# Patient Record
Sex: FEMALE | Race: WHITE | NOT HISPANIC OR LATINO | URBAN - METROPOLITAN AREA
[De-identification: names, ages, dates, MRNs, and addresses within clinical notes are randomized per-mention and may not be internally consistent; named-entity substitution may affect disease eponyms.]

---

## 2022-04-18 ENCOUNTER — HISTORICAL (OUTPATIENT)
Dept: LAB | Facility: HOSPITAL | Age: 67
End: 2022-04-18

## 2022-04-18 LAB
ABS NEUT (OLG): 9.41
ALBUMIN SERPL-MCNC: 3.4 G/DL (ref 3.4–4.8)
ALBUMIN/GLOB SERPL: 0.9 {RATIO} (ref 1.1–2)
ALP SERPL-CCNC: 78 U/L (ref 40–150)
ALT SERPL-CCNC: 12 U/L (ref 0–55)
AST SERPL-CCNC: 19 U/L (ref 5–34)
BASOPHILS # BLD AUTO: 0.04 10*3/UL
BASOPHILS NFR BLD AUTO: 0.3 %
BILIRUB SERPL-MCNC: 0.2 MG/DL
BILIRUBIN DIRECT+TOT PNL SERPL-MCNC: 0.1
BILIRUBIN DIRECT+TOT PNL SERPL-MCNC: 0.1 (ref 0–0.5)
BUN SERPL-MCNC: 12 MG/DL (ref 9.8–20.1)
CALCIUM SERPL-MCNC: 9.1 MG/DL (ref 8.7–10.5)
CHLORIDE SERPL-SCNC: 103 MMOL/L (ref 98–107)
CHOLEST SERPL-MCNC: 225 MG/DL
CHOLEST/HDLC SERPL: 5 {RATIO} (ref 0–5)
CO2 SERPL-SCNC: 28 MMOL/L (ref 23–31)
CREAT SERPL-MCNC: 0.81 MG/DL (ref 0.55–1.02)
EOSINOPHIL # BLD AUTO: 0.07 10*3/UL
EOSINOPHIL NFR BLD AUTO: 0.5 %
ERYTHROCYTE [DISTWIDTH] IN BLOOD BY AUTOMATED COUNT: 15 %
GLOBULIN SER-MCNC: 3.8 G/DL (ref 2.4–3.5)
GLUCOSE SERPL-MCNC: 112 MG/DL (ref 82–115)
HCT VFR BLD AUTO: 43.8 % (ref 34–46)
HDLC SERPL-MCNC: 48 MG/DL (ref 35–60)
HEMOLYSIS INTERF INDEX SERPL-ACNC: 9
HGB BLD-MCNC: 14.1 G/DL (ref 11.3–15.4)
ICTERIC INTERF INDEX SERPL-ACNC: 0
IMM GRANULOCYTES # BLD AUTO: 0.17 10*3/UL (ref 0–0.1)
IMM GRANULOCYTES NFR BLD AUTO: 1.3 % (ref 0–1)
LDLC SERPL CALC-MCNC: 153 MG/DL (ref 50–140)
LIPEMIC INTERF INDEX SERPL-ACNC: 2
LYMPHOCYTES # BLD AUTO: 2.39 10*3/UL
LYMPHOCYTES NFR BLD AUTO: 18.3 %
MANUAL DIFF? (OHS): NO
MCH RBC QN AUTO: 31.8 PG (ref 27–33)
MCHC RBC AUTO-ENTMCNC: 32.2 G/DL (ref 32–35)
MCV RBC AUTO: 98.6 FL (ref 81–97)
MONOCYTES # BLD AUTO: 1.01 10*3/UL
MONOCYTES NFR BLD AUTO: 7.7 %
NEUTROPHILS # BLD AUTO: 9.41 10*3/UL
NEUTROPHILS NFR BLD AUTO: 71.9 %
PLATELET # BLD AUTO: 511 10*3/UL (ref 140–450)
PMV BLD AUTO: 11 FL
POTASSIUM SERPL-SCNC: 3.6 MMOL/L (ref 3.5–5.1)
PROT SERPL-MCNC: 7.2 G/DL (ref 5.8–7.6)
RBC # BLD AUTO: 4.44 10*6/UL (ref 3.9–5)
SODIUM SERPL-SCNC: 143 MMOL/L (ref 136–145)
TRIGL SERPL-MCNC: 122 MG/DL (ref 37–140)
VLDLC SERPL CALC-MCNC: 24 MG/DL
WBC # SPEC AUTO: 13.09 10*3/UL (ref 3.4–9.2)

## 2023-05-26 ENCOUNTER — OFFICE VISIT (OUTPATIENT)
Dept: FAMILY MEDICINE | Facility: CLINIC | Age: 68
End: 2023-05-26
Payer: COMMERCIAL

## 2023-05-26 VITALS
DIASTOLIC BLOOD PRESSURE: 72 MMHG | HEIGHT: 61 IN | WEIGHT: 136 LBS | RESPIRATION RATE: 18 BRPM | TEMPERATURE: 97 F | SYSTOLIC BLOOD PRESSURE: 120 MMHG | BODY MASS INDEX: 25.68 KG/M2 | HEART RATE: 97 BPM | OXYGEN SATURATION: 98 %

## 2023-05-26 DIAGNOSIS — F17.210 CIGARETTE SMOKER: ICD-10-CM

## 2023-05-26 DIAGNOSIS — Z12.31 SCREENING MAMMOGRAM, ENCOUNTER FOR: ICD-10-CM

## 2023-05-26 DIAGNOSIS — I10 HYPERTENSION, UNSPECIFIED TYPE: ICD-10-CM

## 2023-05-26 DIAGNOSIS — F41.9 ANXIETY: ICD-10-CM

## 2023-05-26 DIAGNOSIS — Z12.11 COLON CANCER SCREENING: ICD-10-CM

## 2023-05-26 DIAGNOSIS — Z00.00 WELLNESS EXAMINATION: Primary | ICD-10-CM

## 2023-05-26 PROCEDURE — 3074F SYST BP LT 130 MM HG: CPT | Mod: CPTII,,, | Performed by: FAMILY MEDICINE

## 2023-05-26 PROCEDURE — 1159F MED LIST DOCD IN RCRD: CPT | Mod: CPTII,,, | Performed by: FAMILY MEDICINE

## 2023-05-26 PROCEDURE — 3008F PR BODY MASS INDEX (BMI) DOCUMENTED: ICD-10-PCS | Mod: CPTII,,, | Performed by: FAMILY MEDICINE

## 2023-05-26 PROCEDURE — 3008F BODY MASS INDEX DOCD: CPT | Mod: CPTII,,, | Performed by: FAMILY MEDICINE

## 2023-05-26 PROCEDURE — 3288F PR FALLS RISK ASSESSMENT DOCUMENTED: ICD-10-PCS | Mod: CPTII,,, | Performed by: FAMILY MEDICINE

## 2023-05-26 PROCEDURE — 1101F PR PT FALLS ASSESS DOC 0-1 FALLS W/OUT INJ PAST YR: ICD-10-PCS | Mod: CPTII,,, | Performed by: FAMILY MEDICINE

## 2023-05-26 PROCEDURE — 1101F PT FALLS ASSESS-DOCD LE1/YR: CPT | Mod: CPTII,,, | Performed by: FAMILY MEDICINE

## 2023-05-26 PROCEDURE — 99406 PR TOBACCO USE CESSATION INTERMEDIATE 3-10 MINUTES: ICD-10-PCS | Mod: ,,, | Performed by: FAMILY MEDICINE

## 2023-05-26 PROCEDURE — 99387 PR PREVENTIVE VISIT,NEW,65 & OVER: ICD-10-PCS | Mod: 25,,, | Performed by: FAMILY MEDICINE

## 2023-05-26 PROCEDURE — 1160F RVW MEDS BY RX/DR IN RCRD: CPT | Mod: CPTII,,, | Performed by: FAMILY MEDICINE

## 2023-05-26 PROCEDURE — 1159F PR MEDICATION LIST DOCUMENTED IN MEDICAL RECORD: ICD-10-PCS | Mod: CPTII,,, | Performed by: FAMILY MEDICINE

## 2023-05-26 PROCEDURE — 99406 BEHAV CHNG SMOKING 3-10 MIN: CPT | Mod: ,,, | Performed by: FAMILY MEDICINE

## 2023-05-26 PROCEDURE — 1160F PR REVIEW ALL MEDS BY PRESCRIBER/CLIN PHARMACIST DOCUMENTED: ICD-10-PCS | Mod: CPTII,,, | Performed by: FAMILY MEDICINE

## 2023-05-26 PROCEDURE — 3074F PR MOST RECENT SYSTOLIC BLOOD PRESSURE < 130 MM HG: ICD-10-PCS | Mod: CPTII,,, | Performed by: FAMILY MEDICINE

## 2023-05-26 PROCEDURE — 3078F PR MOST RECENT DIASTOLIC BLOOD PRESSURE < 80 MM HG: ICD-10-PCS | Mod: CPTII,,, | Performed by: FAMILY MEDICINE

## 2023-05-26 PROCEDURE — 99387 INIT PM E/M NEW PAT 65+ YRS: CPT | Mod: 25,,, | Performed by: FAMILY MEDICINE

## 2023-05-26 PROCEDURE — 3288F FALL RISK ASSESSMENT DOCD: CPT | Mod: CPTII,,, | Performed by: FAMILY MEDICINE

## 2023-05-26 PROCEDURE — 3078F DIAST BP <80 MM HG: CPT | Mod: CPTII,,, | Performed by: FAMILY MEDICINE

## 2023-05-26 RX ORDER — ALPRAZOLAM 1 MG/1
1 TABLET ORAL 3 TIMES DAILY
COMMUNITY
Start: 2023-01-16 | End: 2023-05-26 | Stop reason: SDUPTHER

## 2023-05-26 RX ORDER — DILTIAZEM HYDROCHLORIDE 60 MG/1
60 TABLET, FILM COATED ORAL 2 TIMES DAILY
COMMUNITY
Start: 2023-05-19 | End: 2024-02-26

## 2023-05-26 RX ORDER — ALPRAZOLAM 1 MG/1
1 TABLET ORAL 3 TIMES DAILY
Qty: 90 TABLET | Refills: 3 | Status: SHIPPED | OUTPATIENT
Start: 2023-05-26 | End: 2023-10-30

## 2023-05-26 NOTE — PROGRESS NOTES
"Subjective:       Patient ID: Julianna Vaughn is a 67 y.o. female.    Chief Complaint: EST CARE/FRANKS       Establish care    Hypertension  - tolerating medication (no side effects)  - no headaches  - patient without any complaints    Review of Systems   Constitutional: Negative.    Respiratory: Negative.     Cardiovascular: Negative.    Gastrointestinal: Negative.    Psychiatric/Behavioral: Negative.          Anxiety:  Tolerating medication, medication working well, patient without any complaints, needs Rx         Objective:      /72 (BP Location: Left arm, Patient Position: Sitting, BP Method: Large (Manual))   Pulse 97   Temp 96.7 °F (35.9 °C)   Resp 18   Ht 5' 1" (1.549 m)   Wt 61.7 kg (136 lb)   SpO2 98%   BMI 25.70 kg/m²    Physical Exam  Constitutional:       Appearance: Normal appearance.   Cardiovascular:      Rate and Rhythm: Normal rate and regular rhythm.   Pulmonary:      Effort: Pulmonary effort is normal.      Breath sounds: Normal breath sounds.   Abdominal:      General: Abdomen is flat. Bowel sounds are normal.      Palpations: Abdomen is soft.   Musculoskeletal:         General: Normal range of motion.   Skin:     General: Skin is warm.   Neurological:      Mental Status: She is alert.   Psychiatric:         Mood and Affect: Mood normal.         Behavior: Behavior normal.         Thought Content: Thought content normal.         Judgment: Judgment normal.             Assessment:       Problem List Items Addressed This Visit          Psychiatric    Anxiety    Relevant Medications    ALPRAZolam (XANAX) 1 MG tablet       Cardiac/Vascular    Hypertension     Other Visit Diagnoses       Wellness examination    -  Primary    Relevant Orders    CBC Auto Differential    Comprehensive Metabolic Panel    Lipid Panel    Hepatitis C Antibody    HIV 1/2 Ag/Ab (4th Gen)    Mammo Digital Screening Bilat w/ Matthew    Cologuard Screening (Multitarget Stool DNA)    CT Chest Lung Screening Low Dose    Cigarette " smoker        Relevant Orders    CT Chest Lung Screening Low Dose    Colon cancer screening        Relevant Orders    Cologuard Screening (Multitarget Stool DNA)    Screening mammogram, encounter for        Relevant Orders    Mammo Digital Screening Bilat w/ Matthew               Plan:   1. Wellness examination  -     CBC Auto Differential; Future; Expected date: 05/26/2023  -     Comprehensive Metabolic Panel; Future; Expected date: 05/26/2023  -     Lipid Panel; Future; Expected date: 05/26/2023  -     Hepatitis C Antibody; Future; Expected date: 05/26/2023  -     HIV 1/2 Ag/Ab (4th Gen); Future; Expected date: 05/26/2023  -     Mammo Digital Screening Bilat w/ Matthew; Future; Expected date: 05/26/2023  -     Cologuard Screening (Multitarget Stool DNA); Future; Expected date: 05/26/2023  -     CT Chest Lung Screening Low Dose; Future; Expected date: 05/26/2023  Lab work scheduled  Continue current medication  Continue diet/exercise  Return to clinic with any concerns    2. Hypertension, unspecified type  Controlled  Continue current Rx medication  Return to clinic with any concerns    3. Anxiety  -     ALPRAZolam (XANAX) 1 MG tablet; Take 1 tablet (1 mg total) by mouth 3 (three) times daily.  Dispense: 90 tablet; Refill: 3  Controlled  Continue current Rx medication  Return to clinic with any concerns    4. Cigarette smoker  -     CT Chest Lung Screening Low Dose; Future; Expected date: 05/26/2023  Adverse affects of tobacco discussed; 5 minutes spent counseling  Patient defers medication/ tobacco cessation program  Patient encouraged to remain abstinent from tobacco products  Schedule lung cancer screening  Return to clinic with any concerns    5. Colon cancer screening  -     Cologuard Screening (Multitarget Stool DNA); Future; Expected date: 05/26/2023  Patient defers colonoscopy; schedule Cologuard    6. Screening mammogram, encounter for  -     Mammo Digital Screening Bilat w/ Matthew; Future; Expected date:  05/26/2023  Schedule mammogram

## 2023-07-04 ENCOUNTER — HOSPITAL ENCOUNTER (EMERGENCY)
Facility: HOSPITAL | Age: 68
Discharge: HOME | End: 2023-07-04
Attending: EMERGENCY MEDICINE
Payer: COMMERCIAL

## 2023-07-04 VITALS
TEMPERATURE: 98 F | HEART RATE: 92 BPM | HEIGHT: 61 IN | BODY MASS INDEX: 25.49 KG/M2 | OXYGEN SATURATION: 95 % | SYSTOLIC BLOOD PRESSURE: 127 MMHG | DIASTOLIC BLOOD PRESSURE: 81 MMHG | RESPIRATION RATE: 18 BRPM | WEIGHT: 135 LBS

## 2023-07-04 DIAGNOSIS — A08.4 VIRAL GASTROENTERITIS: Primary | ICD-10-CM

## 2023-07-04 LAB
ALBUMIN SERPL-MCNC: 3.8 G/DL (ref 3.4–4.8)
ALBUMIN/GLOB SERPL: 1.1 RATIO (ref 1.1–2)
ALP SERPL-CCNC: 72 UNIT/L (ref 40–150)
ALT SERPL-CCNC: 18 UNIT/L (ref 0–55)
AST SERPL-CCNC: 25 UNIT/L (ref 5–34)
BASOPHILS # BLD AUTO: 0.05 X10(3)/MCL
BASOPHILS NFR BLD AUTO: 0.6 %
BILIRUBIN DIRECT+TOT PNL SERPL-MCNC: 0.3 MG/DL
BUN SERPL-MCNC: 17 MG/DL (ref 9.8–20.1)
CALCIUM SERPL-MCNC: 10 MG/DL (ref 8.4–10.2)
CHLORIDE SERPL-SCNC: 108 MMOL/L (ref 98–107)
CO2 SERPL-SCNC: 26 MMOL/L (ref 23–31)
CREAT SERPL-MCNC: 0.92 MG/DL (ref 0.55–1.02)
EOSINOPHIL # BLD AUTO: 0.27 X10(3)/MCL (ref 0–0.9)
EOSINOPHIL NFR BLD AUTO: 3.2 %
ERYTHROCYTE [DISTWIDTH] IN BLOOD BY AUTOMATED COUNT: 13.6 % (ref 11.5–17)
GFR SERPLBLD CREATININE-BSD FMLA CKD-EPI: >60 MLS/MIN/1.73/M2
GLOBULIN SER-MCNC: 3.5 GM/DL (ref 2.4–3.5)
GLUCOSE SERPL-MCNC: 102 MG/DL (ref 82–115)
HCT VFR BLD AUTO: 39.1 % (ref 37–47)
HGB BLD-MCNC: 13.2 G/DL (ref 12–16)
IMM GRANULOCYTES # BLD AUTO: 0.03 X10(3)/MCL (ref 0–0.04)
IMM GRANULOCYTES NFR BLD AUTO: 0.4 %
LYMPHOCYTES # BLD AUTO: 1.69 X10(3)/MCL (ref 0.6–4.6)
LYMPHOCYTES NFR BLD AUTO: 19.9 %
MCH RBC QN AUTO: 32.4 PG (ref 27–31)
MCHC RBC AUTO-ENTMCNC: 33.8 G/DL (ref 33–36)
MCV RBC AUTO: 95.8 FL (ref 80–94)
MONOCYTES # BLD AUTO: 0.84 X10(3)/MCL (ref 0.1–1.3)
MONOCYTES NFR BLD AUTO: 9.9 %
NEUTROPHILS # BLD AUTO: 5.63 X10(3)/MCL (ref 2.1–9.2)
NEUTROPHILS NFR BLD AUTO: 66 %
NRBC BLD AUTO-RTO: 0 %
PLATELET # BLD AUTO: 345 X10(3)/MCL (ref 130–400)
PMV BLD AUTO: 10.1 FL (ref 7.4–10.4)
POTASSIUM SERPL-SCNC: 3.6 MMOL/L (ref 3.5–5.1)
PROT SERPL-MCNC: 7.3 GM/DL (ref 5.8–7.6)
RBC # BLD AUTO: 4.08 X10(6)/MCL (ref 4.2–5.4)
SODIUM SERPL-SCNC: 145 MMOL/L (ref 136–145)
WBC # SPEC AUTO: 8.51 X10(3)/MCL (ref 4.5–11.5)

## 2023-07-04 PROCEDURE — 85025 COMPLETE CBC W/AUTO DIFF WBC: CPT | Performed by: EMERGENCY MEDICINE

## 2023-07-04 PROCEDURE — 99283 EMERGENCY DEPT VISIT LOW MDM: CPT

## 2023-07-04 PROCEDURE — 80053 COMPREHEN METABOLIC PANEL: CPT | Performed by: EMERGENCY MEDICINE

## 2023-07-04 PROCEDURE — 25000003 PHARM REV CODE 250: Performed by: EMERGENCY MEDICINE

## 2023-07-04 RX ORDER — ONDANSETRON 4 MG/1
4 TABLET, ORALLY DISINTEGRATING ORAL
Status: COMPLETED | OUTPATIENT
Start: 2023-07-04 | End: 2023-07-04

## 2023-07-04 RX ORDER — ONDANSETRON 4 MG/1
4 TABLET, ORALLY DISINTEGRATING ORAL ONCE
Qty: 1 TABLET | Refills: 0 | Status: SHIPPED | OUTPATIENT
Start: 2023-07-04 | End: 2023-07-04

## 2023-07-04 RX ORDER — DEXBROMPHENIRAMINE MALEATE, DEXTROMETHORPHAN HBR, PHENYLEPHRINE HCL 2; 20; 10 G/1; G/1; G/1
1 TABLET ORAL 4 TIMES DAILY PRN
COMMUNITY
Start: 2023-03-30

## 2023-07-04 RX ORDER — FLUTICASONE PROPIONATE 50 MCG
1 SPRAY, SUSPENSION (ML) NASAL
COMMUNITY
Start: 2023-03-30

## 2023-07-04 RX ORDER — ONDANSETRON 4 MG/1
4 TABLET, ORALLY DISINTEGRATING ORAL ONCE
Qty: 1 TABLET | Refills: 0 | Status: SHIPPED | OUTPATIENT
Start: 2023-07-04 | End: 2023-07-04 | Stop reason: SDUPTHER

## 2023-07-04 RX ADMIN — ONDANSETRON 4 MG: 4 TABLET, ORALLY DISINTEGRATING ORAL at 12:07

## 2023-07-04 NOTE — ED PROVIDER NOTES
Encounter Date: 7/4/2023       History     Chief Complaint   Patient presents with    Nausea    Vomiting    Diarrhea     Diarrhea x2 days vomiting started yesterday. Denies any abd pain.     Patient is a 67-year-old female presenting with complain of intermittent nausea diarrhea x2 days.  Patient denies any fever chills.  No abdominal pain.  Patient denies chest pain or cough.  No shortness of breath.  Patient denies any nausea currently.  Patient denies any bright red blood per rectum or melena.    Review of patient's allergies indicates:  No Known Allergies  Past Medical History:   Diagnosis Date    Anxiety disorder, unspecified     Essential (primary) hypertension      History reviewed. No pertinent surgical history.  History reviewed. No pertinent family history.  Social History     Tobacco Use    Smoking status: Every Day     Packs/day: 1.00     Years: 35.00     Pack years: 35.00     Types: Cigarettes    Smokeless tobacco: Never   Substance Use Topics    Alcohol use: Yes    Drug use: Never     Review of Systems   Constitutional: Negative.    HENT: Negative.     Cardiovascular: Negative.    Gastrointestinal:  Positive for diarrhea, nausea and vomiting. Negative for abdominal distention, abdominal pain, anal bleeding, blood in stool and constipation.   Genitourinary: Negative.    Musculoskeletal: Negative.    Neurological: Negative.    Psychiatric/Behavioral: Negative.       Physical Exam     Initial Vitals [07/04/23 1218]   BP Pulse Resp Temp SpO2   127/81 92 18 98.1 °F (36.7 °C) 95 %      MAP       --         Physical Exam    Nursing note and vitals reviewed.  Constitutional: She appears well-developed and well-nourished.   HENT:   Head: Normocephalic.   Neck: Neck supple.   Normal range of motion.  Cardiovascular:  Normal rate, regular rhythm and normal heart sounds.           Pulmonary/Chest: Breath sounds normal. No respiratory distress. She has no rhonchi.   Abdominal: Abdomen is soft. There is no abdominal  tenderness. There is no guarding.   Musculoskeletal:         General: Normal range of motion.      Cervical back: Normal range of motion and neck supple.     Neurological: She is alert and oriented to person, place, and time. She has normal strength.   Psychiatric: She has a normal mood and affect.       ED Course   Procedures  Labs Reviewed   COMPREHENSIVE METABOLIC PANEL - Abnormal; Notable for the following components:       Result Value    Chloride 108 (*)     All other components within normal limits   CBC WITH DIFFERENTIAL - Abnormal; Notable for the following components:    RBC 4.08 (*)     MCV 95.8 (*)     MCH 32.4 (*)     All other components within normal limits   CBC W/ AUTO DIFFERENTIAL    Narrative:     The following orders were created for panel order CBC auto differential.  Procedure                               Abnormality         Status                     ---------                               -----------         ------                     CBC with Differential[[id number]]        Abnormal            Final result                 Please view results for these tests on the individual orders.          Imaging Results    None          Medications   ondansetron disintegrating tablet 4 mg (4 mg Oral Given 7/4/23 1243)     Medical Decision Making:   Initial Assessment:   As per HPI  Differential Diagnosis:   Viral syndrome, acute diarrhea, nausea, dehydration, electrolyte abnormality  Clinical Tests:   Lab Tests: Ordered and Reviewed       <> Summary of Lab: All labs are stable  ED Management:  No vomiting while in the ER.  Patient continues to deny any nausea.  All labs are stable.  Will discharge patient home with a prescription for Zofran.           ED Course as of 07/04/23 1319   Tue Jul 04, 2023   1316 Patient remains in no apparent distress.  Patient denies any nausea still [KG]      ED Course User Index  [KG] Timmy Nelson MD                 Clinical Impression:   Final diagnoses:  [A08.4]  Viral gastroenteritis (Primary)        ED Disposition Condition    Discharge Stable          ED Prescriptions       Medication Sig Dispense Start Date End Date Auth. Provider    ondansetron (ZOFRAN-ODT) 4 MG TbDL (Expires today) Take 1 tablet (4 mg total) by mouth once. for 1 dose 1 tablet 7/4/2023 7/4/2023 Timmy Nelson MD          Follow-up Information       Follow up With Specialties Details Why Contact Info    Be Carpenter MD Family Medicine In 2 days  [address] [facility] [address]  [phone]      [facility] Emergency Medicine  As needed, If symptoms worsen [address] [place] [address] [id number] [phone]             Timmy Nelson MD  07/04/23 4375

## 2023-07-04 NOTE — ED NOTES
Pt ambulatory to room.  Reports diarrhea starting two days ago and nausea since yesterday.  Denies any abdominal pain, reports nausea.  Bilateral bowel sounds WNL.  Unable to keep solid food down.

## 2023-08-11 LAB — NONINV COLON CA DNA+OCC BLD SCRN STL QL: POSITIVE

## 2023-08-14 NOTE — PROGRESS NOTES
POSITIVE cologuard. Will need referral for a diagnostic colonoscopy.  Please refer patient to Dr. Jiang

## 2023-08-15 ENCOUNTER — TELEPHONE (OUTPATIENT)
Dept: FAMILY MEDICINE | Facility: CLINIC | Age: 68
End: 2023-08-15
Payer: MEDICARE

## 2023-08-15 DIAGNOSIS — R19.5 POSITIVE COLORECTAL CANCER SCREENING USING COLOGUARD TEST: Primary | ICD-10-CM

## 2023-08-15 NOTE — TELEPHONE ENCOUNTER
----- Message from Be Carpenter MD sent at 8/14/2023 11:55 AM CDT -----  POSITIVE cologuard. Will need referral for a diagnostic colonoscopy.  Please refer patient to Dr. Jiang

## 2023-08-16 ENCOUNTER — TELEPHONE (OUTPATIENT)
Dept: FAMILY MEDICINE | Facility: CLINIC | Age: 68
End: 2023-08-16
Payer: MEDICARE

## 2023-08-16 NOTE — TELEPHONE ENCOUNTER
Pt called stating she does not want to do the colonoscopy to cancel the referral. Explained to pt the risk because of abnormal cologuard.

## 2023-10-30 DIAGNOSIS — F41.9 ANXIETY: ICD-10-CM

## 2023-10-30 RX ORDER — ALPRAZOLAM 1 MG/1
1 TABLET ORAL 3 TIMES DAILY
Qty: 90 TABLET | Refills: 3 | Status: SHIPPED | OUTPATIENT
Start: 2023-10-30

## 2023-11-14 DIAGNOSIS — F41.9 ANXIETY: ICD-10-CM

## 2023-12-06 ENCOUNTER — OFFICE VISIT (OUTPATIENT)
Dept: FAMILY MEDICINE | Facility: CLINIC | Age: 68
End: 2023-12-06
Payer: MEDICARE

## 2023-12-06 VITALS
HEART RATE: 68 BPM | BODY MASS INDEX: 25.3 KG/M2 | HEIGHT: 61 IN | TEMPERATURE: 98 F | DIASTOLIC BLOOD PRESSURE: 68 MMHG | RESPIRATION RATE: 18 BRPM | OXYGEN SATURATION: 98 % | WEIGHT: 134 LBS | SYSTOLIC BLOOD PRESSURE: 106 MMHG

## 2023-12-06 DIAGNOSIS — I10 HYPERTENSION, UNSPECIFIED TYPE: Primary | ICD-10-CM

## 2023-12-06 DIAGNOSIS — F41.9 ANXIETY: ICD-10-CM

## 2023-12-06 DIAGNOSIS — Z12.31 SCREENING MAMMOGRAM, ENCOUNTER FOR: ICD-10-CM

## 2023-12-06 PROCEDURE — 99214 OFFICE O/P EST MOD 30 MIN: CPT | Mod: ,,, | Performed by: FAMILY MEDICINE

## 2023-12-06 PROCEDURE — 99214 PR OFFICE/OUTPT VISIT, EST, LEVL IV, 30-39 MIN: ICD-10-PCS | Mod: ,,, | Performed by: FAMILY MEDICINE

## 2023-12-06 NOTE — PROGRESS NOTES
"Subjective:       Patient ID: Julianna Vaughn is a 68 y.o. female.    Chief Complaint: 6 month follow up      Routine        Review of Systems   Constitutional: Negative.    Respiratory: Negative.     Cardiovascular: Negative.    Gastrointestinal: Negative.    Psychiatric/Behavioral: Negative.             Objective:      /68 (BP Location: Left arm, Patient Position: Sitting, BP Method: Large (Manual))   Pulse 68   Temp 97.6 °F (36.4 °C)   Resp 18   Ht 5' 1" (1.549 m)   Wt 60.8 kg (134 lb)   SpO2 98%   BMI 25.32 kg/m²    Physical Exam  Constitutional:       Appearance: Normal appearance.   Cardiovascular:      Rate and Rhythm: Normal rate and regular rhythm.      Heart sounds: Normal heart sounds.   Pulmonary:      Effort: Pulmonary effort is normal.      Breath sounds: Normal breath sounds.   Neurological:      Mental Status: She is alert.   Psychiatric:         Mood and Affect: Mood normal.         Behavior: Behavior normal.         Thought Content: Thought content normal.         Judgment: Judgment normal.               Assessment:       Problem List Items Addressed This Visit    None         Plan:   {There are no diagnoses linked to this encounter. (Refresh or delete this SmartLink)}           "

## 2023-12-06 NOTE — PROGRESS NOTES
"Subjective:       Patient ID: Julianna Vaughn is a 68 y.o. female.    Chief Complaint: 6 month follow up      Routine      Hypertension  - tolerating medication (no side effects)  - no headaches  - patient without any complaints    Review of Systems   Constitutional: Negative.    Respiratory: Negative.     Cardiovascular: Negative.    Gastrointestinal: Negative.    Psychiatric/Behavioral: Negative.          Anxiety: tolerating medication, medication working well, patient without any complaints             Objective:      /68 (BP Location: Left arm, Patient Position: Sitting, BP Method: Large (Manual))   Pulse 68   Temp 97.6 °F (36.4 °C)   Resp 18   Ht 5' 1" (1.549 m)   Wt 60.8 kg (134 lb)   SpO2 98%   BMI 25.32 kg/m²    Physical Exam  Constitutional:       Appearance: Normal appearance.   Cardiovascular:      Rate and Rhythm: Normal rate and regular rhythm.      Heart sounds: Normal heart sounds.   Pulmonary:      Effort: Pulmonary effort is normal.      Breath sounds: Normal breath sounds.   Neurological:      Mental Status: She is alert.   Psychiatric:         Mood and Affect: Mood normal.         Behavior: Behavior normal.         Thought Content: Thought content normal.         Judgment: Judgment normal.               Assessment:       Problem List Items Addressed This Visit          Psychiatric    Anxiety       Cardiac/Vascular    Hypertension - Primary     Other Visit Diagnoses       Screening mammogram, encounter for                   Plan:   1. Hypertension, unspecified type  Controlled  Continue current Rx medication  Return to clinic with any concerns    2. Anxiety  Controlled  Continue current Rx medication  Return to clinic with any concerns    3. Screening mammogram, encounter for  Patient defers mammogram             "

## 2024-02-26 DIAGNOSIS — I10 HYPERTENSION, UNSPECIFIED TYPE: Primary | ICD-10-CM

## 2024-02-26 RX ORDER — DILTIAZEM HYDROCHLORIDE 60 MG/1
60 TABLET, FILM COATED ORAL 2 TIMES DAILY
Qty: 60 TABLET | Refills: 3 | Status: SHIPPED | OUTPATIENT
Start: 2024-02-26

## 2024-04-09 DIAGNOSIS — R09.89 CHEST CONGESTION: Primary | ICD-10-CM

## 2024-04-09 RX ORDER — PROMETHAZINE HYDROCHLORIDE AND DEXTROMETHORPHAN HYDROBROMIDE 6.25; 15 MG/5ML; MG/5ML
5 SYRUP ORAL EVERY 4 HOURS PRN
Qty: 180 ML | Refills: 0 | Status: SHIPPED | OUTPATIENT
Start: 2024-04-09 | End: 2024-04-19

## 2024-04-09 RX ORDER — BROMPHENIRAMINE MALEATE AND PHENYLEPHRINE HYDROCHLORIDE 4; 10 MG/1; MG/1
1 TABLET, COATED ORAL EVERY 4 HOURS PRN
Qty: 30 EACH | Refills: 0 | Status: SHIPPED | OUTPATIENT
Start: 2024-04-09 | End: 2024-04-19

## 2024-05-06 DIAGNOSIS — F41.9 ANXIETY: ICD-10-CM

## 2024-05-06 RX ORDER — ALPRAZOLAM 1 MG/1
1 TABLET ORAL 3 TIMES DAILY
Qty: 90 TABLET | Refills: 3 | Status: SHIPPED | OUTPATIENT
Start: 2024-05-06

## 2024-05-09 DIAGNOSIS — I10 HYPERTENSION, UNSPECIFIED TYPE: ICD-10-CM

## 2024-05-09 DIAGNOSIS — Z11.4 ENCOUNTER FOR HIV (HUMAN IMMUNODEFICIENCY VIRUS) TEST: Primary | ICD-10-CM

## 2024-05-09 DIAGNOSIS — Z13.6 SCREENING FOR ISCHEMIC HEART DISEASE: ICD-10-CM

## 2024-05-09 DIAGNOSIS — Z00.00 WELLNESS EXAMINATION: ICD-10-CM

## 2024-05-09 DIAGNOSIS — Z11.59 NEED FOR HEPATITIS C SCREENING TEST: ICD-10-CM

## 2024-05-24 DIAGNOSIS — Z12.31 SCREENING MAMMOGRAM, ENCOUNTER FOR: Primary | ICD-10-CM

## 2024-06-06 ENCOUNTER — HOSPITAL ENCOUNTER (OUTPATIENT)
Dept: RADIOLOGY | Facility: HOSPITAL | Age: 69
Discharge: HOME | End: 2024-06-06
Attending: FAMILY MEDICINE
Payer: COMMERCIAL

## 2024-06-06 DIAGNOSIS — Z12.31 SCREENING MAMMOGRAM, ENCOUNTER FOR: ICD-10-CM

## 2024-06-06 PROCEDURE — 77063 BREAST TOMOSYNTHESIS BI: CPT | Mod: 26,,, | Performed by: STUDENT IN AN ORGANIZED HEALTH CARE EDUCATION/TRAINING PROGRAM

## 2024-06-06 PROCEDURE — 77067 SCR MAMMO BI INCL CAD: CPT | Mod: 26,,, | Performed by: STUDENT IN AN ORGANIZED HEALTH CARE EDUCATION/TRAINING PROGRAM

## 2024-06-06 PROCEDURE — 77067 SCR MAMMO BI INCL CAD: CPT | Mod: TC

## 2024-06-06 PROCEDURE — 77063 BREAST TOMOSYNTHESIS BI: CPT | Mod: TC

## 2024-06-10 ENCOUNTER — TELEPHONE (OUTPATIENT)
Dept: FAMILY MEDICINE | Facility: CLINIC | Age: 69
End: 2024-06-10
Payer: COMMERCIAL

## 2024-06-10 NOTE — TELEPHONE ENCOUNTER
----- Message from Be Carpenter MD sent at 6/10/2024  4:19 PM CDT -----  Normal MMG. Repeat in 1 year.

## 2024-06-13 ENCOUNTER — OFFICE VISIT (OUTPATIENT)
Dept: FAMILY MEDICINE | Facility: CLINIC | Age: 69
End: 2024-06-13
Payer: COMMERCIAL

## 2024-06-13 VITALS
HEIGHT: 61 IN | WEIGHT: 129.81 LBS | SYSTOLIC BLOOD PRESSURE: 122 MMHG | DIASTOLIC BLOOD PRESSURE: 62 MMHG | HEART RATE: 68 BPM | BODY MASS INDEX: 24.51 KG/M2 | TEMPERATURE: 98 F | RESPIRATION RATE: 18 BRPM | OXYGEN SATURATION: 100 %

## 2024-06-13 DIAGNOSIS — M54.50 CHRONIC RIGHT-SIDED LOW BACK PAIN WITHOUT SCIATICA: ICD-10-CM

## 2024-06-13 DIAGNOSIS — F41.9 ANXIETY: ICD-10-CM

## 2024-06-13 DIAGNOSIS — F17.210 CIGARETTE SMOKER: ICD-10-CM

## 2024-06-13 DIAGNOSIS — G89.29 CHRONIC RIGHT-SIDED LOW BACK PAIN WITHOUT SCIATICA: ICD-10-CM

## 2024-06-13 DIAGNOSIS — I10 HYPERTENSION, UNSPECIFIED TYPE: ICD-10-CM

## 2024-06-13 DIAGNOSIS — Z00.00 WELLNESS EXAMINATION: Primary | ICD-10-CM

## 2024-06-13 PROCEDURE — 3078F DIAST BP <80 MM HG: CPT | Mod: CPTII,,, | Performed by: FAMILY MEDICINE

## 2024-06-13 PROCEDURE — 3008F BODY MASS INDEX DOCD: CPT | Mod: CPTII,,, | Performed by: FAMILY MEDICINE

## 2024-06-13 PROCEDURE — 1101F PT FALLS ASSESS-DOCD LE1/YR: CPT | Mod: CPTII,,, | Performed by: FAMILY MEDICINE

## 2024-06-13 PROCEDURE — 3074F SYST BP LT 130 MM HG: CPT | Mod: CPTII,,, | Performed by: FAMILY MEDICINE

## 2024-06-13 PROCEDURE — 1159F MED LIST DOCD IN RCRD: CPT | Mod: CPTII,,, | Performed by: FAMILY MEDICINE

## 2024-06-13 PROCEDURE — 1160F RVW MEDS BY RX/DR IN RCRD: CPT | Mod: CPTII,,, | Performed by: FAMILY MEDICINE

## 2024-06-13 PROCEDURE — 3288F FALL RISK ASSESSMENT DOCD: CPT | Mod: CPTII,,, | Performed by: FAMILY MEDICINE

## 2024-06-13 PROCEDURE — 99397 PER PM REEVAL EST PAT 65+ YR: CPT | Mod: ,,, | Performed by: FAMILY MEDICINE

## 2024-06-13 NOTE — PROGRESS NOTES
"Subjective:      Patient ID: Julianna Vaughn is a 68 y.o. female.    Chief Complaint: Wellness      Wellness    Hypertension  Controlled  Continue current Rx medication  Return to clinic with any concerns    Review of Systems   Constitutional: Negative.    HENT: Negative.     Respiratory: Negative.     Cardiovascular: Negative.    Gastrointestinal: Negative.  Negative for fecal incontinence.   Genitourinary: Negative.  Negative for bladder incontinence.   Musculoskeletal:  Positive for back pain (by chiropractor in the past, reports pain radiates down right leg).   Integumentary:  Negative.   Neurological: Negative.    Hematological: Negative.    Psychiatric/Behavioral: Negative.          Tobacco use: 1/2 pk/day/25 years         Objective:     /62 (BP Location: Left arm, Patient Position: Sitting, BP Method: Large (Manual))   Pulse 68   Temp 97.9 °F (36.6 °C)   Resp 18   Ht 5' 1" (1.549 m)   Wt 58.9 kg (129 lb 12.8 oz)   SpO2 100%   BMI 24.53 kg/m²    Physical Exam  Constitutional:       Appearance: Normal appearance.   Cardiovascular:      Rate and Rhythm: Normal rate and regular rhythm.   Pulmonary:      Effort: Pulmonary effort is normal.      Breath sounds: Normal breath sounds.   Abdominal:      General: Abdomen is flat. Bowel sounds are normal.      Palpations: Abdomen is soft.   Musculoskeletal:         General: Normal range of motion.   Neurological:      Mental Status: She is alert.   Psychiatric:         Mood and Affect: Mood normal.         Behavior: Behavior normal.         Thought Content: Thought content normal.         Judgment: Judgment normal.       Recent Results (from the past 504 hour(s))   Lipid Panel    Collection Time: 06/06/24 10:03 AM   Result Value Ref Range    Cholesterol Total 226 (H) <=200 mg/dL    HDL Cholesterol 51 35 - 60 mg/dL    Triglyceride 136 37 - 140 mg/dL    Cholesterol/HDL Ratio 4 0 - 5    Very Low Density Lipoprotein 27     LDL Cholesterol 148.00 (H) 50.00 - 140.00 " mg/dL   Comprehensive Metabolic Panel    Collection Time: 06/06/24 10:03 AM   Result Value Ref Range    Sodium 142 136 - 145 mmol/L    Potassium 4.2 3.5 - 5.1 mmol/L    Chloride 105 98 - 107 mmol/L    CO2 27 23 - 31 mmol/L    Glucose 112 82 - 115 mg/dL    Blood Urea Nitrogen 25.0 (H) 9.8 - 20.1 mg/dL    Creatinine 0.90 0.55 - 1.02 mg/dL    Calcium 9.8 8.4 - 10.2 mg/dL    Protein Total 7.6 5.8 - 7.6 gm/dL    Albumin 3.9 3.4 - 4.8 g/dL    Globulin 3.7 (H) 2.4 - 3.5 gm/dL    Albumin/Globulin Ratio 1.1 1.1 - 2.0 ratio    Bilirubin Total 0.2 <=1.5 mg/dL    ALP 91 40 - 150 unit/L    ALT 13 0 - 55 unit/L    AST 20 5 - 34 unit/L    eGFR >60 mL/min/1.73/m2    Anion Gap 10.0 mEq/L    BUN/Creatinine Ratio 28    Hepatitis C Antibody    Collection Time: 06/06/24 10:03 AM   Result Value Ref Range    Hep C Ab Interp Nonreactive Nonreactive   HIV 1/2 Ag/Ab (4th Gen)    Collection Time: 06/06/24 10:03 AM   Result Value Ref Range    HIV Nonreactive Nonreactive   CBC with Differential    Collection Time: 06/06/24 10:03 AM   Result Value Ref Range    WBC 9.39 4.50 - 11.50 x10(3)/mcL    RBC 4.52 4.20 - 5.40 x10(6)/mcL    Hgb 14.1 12.0 - 16.0 g/dL    Hct 43.0 37.0 - 47.0 %    MCV 95.1 (H) 80.0 - 94.0 fL    MCH 31.2 (H) 27.0 - 31.0 pg    MCHC 32.8 (L) 33.0 - 36.0 g/dL    RDW 14.1 11.5 - 17.0 %    Platelet 417 (H) 130 - 400 x10(3)/mcL    MPV 10.0 7.4 - 10.4 fL    Neut % 67.6 %    Lymph % 22.3 %    Mono % 7.6 %    Eos % 1.5 %    Basophil % 0.5 %    Lymph # 2.09 0.6 - 4.6 x10(3)/mcL    Neut # 6.35 2.1 - 9.2 x10(3)/mcL    Mono # 0.71 0.1 - 1.3 x10(3)/mcL    Eos # 0.14 0 - 0.9 x10(3)/mcL    Baso # 0.05 <=0.2 x10(3)/mcL    IG# 0.05 (H) 0 - 0.04 x10(3)/mcL    IG% 0.5 %    NRBC% 0.0 %            Assessment:     Problem List Items Addressed This Visit          Psychiatric    Anxiety       Cardiac/Vascular    Hypertension     Other Visit Diagnoses       Wellness examination    -  Primary    Cigarette smoker        Chronic right-sided low back pain  without sciatica        Relevant Orders    X-Ray Lumbar Spine 2 Or 3 Views             Plan:   1. Wellness examination  Lab work discussed with patient  Continue current medication  Continue diet/exercise  Return to clinic with any concerns    2. Hypertension, unspecified type  Controlled  Continue current Rx medication  Return to clinic with any concerns    3. Anxiety  Controlled  Continue current Rx medication  Return to clinic with any concerns    4. Cigarette smoker  Adverse affects of tobacco discussed; 5 minutes spent counseling  Discussed medication; patient defers at this time  Patient encouraged to remain abstinent from tobacco products  Patient defers lung cancer screening  Return to clinic with any concerns    5. Chronic right-sided low back pain without sciatica  Schedule L-spine x-ray  OTC NSAIDs   Monitor   Return to clinic with any concerns

## 2024-06-18 ENCOUNTER — HOSPITAL ENCOUNTER (OUTPATIENT)
Dept: RADIOLOGY | Facility: HOSPITAL | Age: 69
Discharge: HOME | End: 2024-06-18
Attending: FAMILY MEDICINE
Payer: COMMERCIAL

## 2024-06-18 DIAGNOSIS — G89.29 CHRONIC RIGHT-SIDED LOW BACK PAIN WITHOUT SCIATICA: ICD-10-CM

## 2024-06-18 DIAGNOSIS — M54.50 CHRONIC RIGHT-SIDED LOW BACK PAIN WITHOUT SCIATICA: ICD-10-CM

## 2024-06-18 PROCEDURE — 72100 X-RAY EXAM L-S SPINE 2/3 VWS: CPT | Mod: TC

## 2024-06-19 ENCOUNTER — TELEPHONE (OUTPATIENT)
Dept: FAMILY MEDICINE | Facility: CLINIC | Age: 69
End: 2024-06-19
Payer: COMMERCIAL

## 2024-06-19 NOTE — TELEPHONE ENCOUNTER
----- Message from Be Carpenter MD sent at 6/18/2024  6:19 PM CDT -----  Please inform patient of results.     1. Degenerative changes

## 2024-06-20 DIAGNOSIS — G89.29 CHRONIC RIGHT-SIDED LOW BACK PAIN WITHOUT SCIATICA: Primary | ICD-10-CM

## 2024-06-20 DIAGNOSIS — M54.50 CHRONIC RIGHT-SIDED LOW BACK PAIN WITHOUT SCIATICA: Primary | ICD-10-CM

## 2024-06-26 RX ORDER — VARENICLINE TARTRATE 1 MG/1
1 TABLET, FILM COATED ORAL 2 TIMES DAILY
Qty: 60 TABLET | Refills: 4 | Status: SHIPPED | OUTPATIENT
Start: 2024-06-26 | End: 2024-07-26

## 2024-06-26 RX ORDER — VARENICLINE TARTRATE 0.5 (11)-1
KIT ORAL
Qty: 1 EACH | Refills: 0 | Status: SHIPPED | OUTPATIENT
Start: 2024-06-26

## 2024-07-19 DIAGNOSIS — I10 HYPERTENSION, UNSPECIFIED TYPE: ICD-10-CM

## 2024-07-22 RX ORDER — DILTIAZEM HYDROCHLORIDE 60 MG/1
60 TABLET, FILM COATED ORAL 2 TIMES DAILY
Qty: 60 TABLET | Refills: 3 | Status: SHIPPED | OUTPATIENT
Start: 2024-07-22

## 2024-10-07 DIAGNOSIS — F41.9 ANXIETY: ICD-10-CM

## 2024-10-07 RX ORDER — ALPRAZOLAM 1 MG/1
1 TABLET ORAL 3 TIMES DAILY
Qty: 90 TABLET | Refills: 1 | Status: SHIPPED | OUTPATIENT
Start: 2024-10-07

## 2024-10-07 NOTE — TELEPHONE ENCOUNTER
----- Message from Jagruti sent at 10/7/2024  9:17 AM CDT -----  Regarding: med refill  Alprazolam    [facility]    [phone]

## 2024-10-10 DIAGNOSIS — I10 HYPERTENSION, UNSPECIFIED TYPE: ICD-10-CM

## 2024-10-10 RX ORDER — DILTIAZEM HYDROCHLORIDE 60 MG/1
60 TABLET, FILM COATED ORAL 2 TIMES DAILY
Qty: 60 TABLET | Refills: 3 | Status: SHIPPED | OUTPATIENT
Start: 2024-10-10

## 2024-10-24 ENCOUNTER — TELEPHONE (OUTPATIENT)
Dept: FAMILY MEDICINE | Facility: CLINIC | Age: 69
End: 2024-10-24
Payer: COMMERCIAL

## 2024-11-15 DIAGNOSIS — F41.9 ANXIETY: ICD-10-CM

## 2024-11-18 ENCOUNTER — TELEPHONE (OUTPATIENT)
Dept: FAMILY MEDICINE | Facility: CLINIC | Age: 69
End: 2024-11-18
Payer: COMMERCIAL

## 2024-11-18 DIAGNOSIS — I10 HYPERTENSION, UNSPECIFIED TYPE: ICD-10-CM

## 2024-11-18 RX ORDER — DILTIAZEM HYDROCHLORIDE 60 MG/1
60 TABLET, FILM COATED ORAL 2 TIMES DAILY
Qty: 60 TABLET | Refills: 3 | Status: SHIPPED | OUTPATIENT
Start: 2024-11-18

## 2024-11-18 RX ORDER — ALPRAZOLAM 1 MG/1
1 TABLET ORAL 3 TIMES DAILY
Qty: 90 TABLET | Refills: 1 | Status: SHIPPED | OUTPATIENT
Start: 2024-11-18

## 2024-11-18 NOTE — TELEPHONE ENCOUNTER
----- Message from Darby sent at 11/18/2024  8:10 AM CST -----  Regarding: REFILL MED  NEED A REFILL ON DILDAZEM BLOOD PRESSURE MEDICATION TO [facility]

## 2024-12-24 DIAGNOSIS — I10 HYPERTENSION, UNSPECIFIED TYPE: ICD-10-CM

## 2024-12-24 DIAGNOSIS — F41.9 ANXIETY: ICD-10-CM

## 2024-12-26 RX ORDER — DILTIAZEM HYDROCHLORIDE 60 MG/1
60 TABLET, FILM COATED ORAL 2 TIMES DAILY
Qty: 60 TABLET | Refills: 3 | Status: SHIPPED | OUTPATIENT
Start: 2024-12-26

## 2024-12-26 RX ORDER — ALPRAZOLAM 1 MG/1
1 TABLET ORAL 3 TIMES DAILY
Qty: 90 TABLET | Refills: 0 | Status: SHIPPED | OUTPATIENT
Start: 2024-12-26

## 2025-01-16 ENCOUNTER — OFFICE VISIT (OUTPATIENT)
Dept: FAMILY MEDICINE | Facility: CLINIC | Age: 70
End: 2025-01-16
Payer: COMMERCIAL

## 2025-01-16 VITALS
BODY MASS INDEX: 24.13 KG/M2 | SYSTOLIC BLOOD PRESSURE: 130 MMHG | RESPIRATION RATE: 18 BRPM | OXYGEN SATURATION: 100 % | TEMPERATURE: 97 F | HEIGHT: 61 IN | WEIGHT: 127.81 LBS | DIASTOLIC BLOOD PRESSURE: 72 MMHG | HEART RATE: 92 BPM

## 2025-01-16 DIAGNOSIS — F17.210 CIGARETTE SMOKER: ICD-10-CM

## 2025-01-16 DIAGNOSIS — G25.81 RESTLESS LEG SYNDROME: ICD-10-CM

## 2025-01-16 DIAGNOSIS — I10 HYPERTENSION, UNSPECIFIED TYPE: Primary | ICD-10-CM

## 2025-01-16 DIAGNOSIS — Z78.0 POSTMENOPAUSAL: ICD-10-CM

## 2025-01-16 DIAGNOSIS — F41.9 ANXIETY: ICD-10-CM

## 2025-01-16 PROCEDURE — 99214 OFFICE O/P EST MOD 30 MIN: CPT | Mod: ,,,

## 2025-01-16 PROCEDURE — 3008F BODY MASS INDEX DOCD: CPT | Mod: CPTII,,,

## 2025-01-16 PROCEDURE — 3075F SYST BP GE 130 - 139MM HG: CPT | Mod: CPTII,,,

## 2025-01-16 PROCEDURE — 1159F MED LIST DOCD IN RCRD: CPT | Mod: CPTII,,,

## 2025-01-16 PROCEDURE — 1126F AMNT PAIN NOTED NONE PRSNT: CPT | Mod: CPTII,,,

## 2025-01-16 PROCEDURE — 1101F PT FALLS ASSESS-DOCD LE1/YR: CPT | Mod: CPTII,,,

## 2025-01-16 PROCEDURE — 1160F RVW MEDS BY RX/DR IN RCRD: CPT | Mod: CPTII,,,

## 2025-01-16 PROCEDURE — 3288F FALL RISK ASSESSMENT DOCD: CPT | Mod: CPTII,,,

## 2025-01-16 PROCEDURE — 3078F DIAST BP <80 MM HG: CPT | Mod: CPTII,,,

## 2025-01-16 NOTE — PROGRESS NOTES
Family Medicine      Patient ID: [id number]     Chief Complaint: 6 month f/u and restless legs (Patient c/o restless legs at night x6 months.)      HPI:     Julianna Vaughn is a 69 y.o. female here today for six-month chronic condition follow-up. Tolerating diltiazem without side effects, no headaches, regularly checks home blood pressure with readings 120s/70s.  Additionally tolerating Xanax without side effects for anxiety, medication works well, reports she takes very sparingly a few times per week q.h.s. p.r.n. anxiety.    Additional complaint of bilateral restless legs almost every night for the past 6 months, no relief with OTC RLS supplement. States she was previously prescribed iron supplement to assist with RLS, however she stopped taking it several months ago. Recently resumed OTC iron within the past week and has already noticed improvement in RLS.  Denies any associated bilateral LE numbness/tingling/pain.  Reports she has tried gabapentin in the past for similar complaint and did not like the way the medication made her feel.    Additionally patient reports she had a recent LDCT lung screening [place] at [place]; we will request records.    Past Medical History:   Diagnosis Date    Anxiety disorder, unspecified     Essential (primary) hypertension         Past Surgical History:   Procedure Laterality Date    RESECTION OF ANEURYSM  10/15/2024    Dr. Gavin Gardner in [place]        Review of patient's allergies indicates:  No Known Allergies     Patient Care Team:  Be Carpenter MD as PCP - General (Family Medicine)  MARY ANNE Gardner MD (Cardiothoracic Surgery)  Santhosh Quinones MD (Anesthesiology)     Subjective:     Review of Systems   Constitutional: Negative.    Respiratory: Negative.     Cardiovascular: Negative.    Gastrointestinal: Negative.    Genitourinary: Negative.    Musculoskeletal:         Bilateral restless legs at night   Skin: Negative.    Neurological:  "Negative.    Psychiatric/Behavioral: Negative.         Objective:     Visit Vitals  /72 (BP Location: Left arm, Patient Position: Sitting)   Pulse 92   Temp 97.2 °F (36.2 °C)   Resp 18   Ht 5' 1" (1.549 m)   Wt 58 kg (127 lb 12.8 oz)   SpO2 100%   BMI 24.15 kg/m²       Physical Exam  Constitutional:       General: She is not in acute distress.     Appearance: Normal appearance. She is not ill-appearing.   Cardiovascular:      Rate and Rhythm: Normal rate and regular rhythm.      Heart sounds: Normal heart sounds.   Pulmonary:      Effort: Pulmonary effort is normal. No respiratory distress.      Breath sounds: Normal breath sounds. No wheezing, rhonchi or rales.   Abdominal:      General: Bowel sounds are normal. There is no distension.      Palpations: Abdomen is soft.      Tenderness: There is no abdominal tenderness. There is no guarding.   Musculoskeletal:         General: Normal range of motion.      Cervical back: Neck supple.      Right lower leg: No edema.      Left lower leg: No edema.   Skin:     General: Skin is warm and dry.      Capillary Refill: Capillary refill takes less than 2 seconds.   Neurological:      General: No focal deficit present.      Mental Status: She is alert and oriented to person, place, and time. Mental status is at baseline.      Sensory: Sensation is intact.      Motor: Motor function is intact.      Coordination: Coordination is intact.      Gait: Gait is intact.   Psychiatric:         Mood and Affect: Mood normal.         Behavior: Behavior normal.         Thought Content: Thought content normal.         Judgment: Judgment normal.       Assessment:       ICD-10-CM ICD-9-CM   1. Hypertension, unspecified type  I10 401.9   2. Anxiety  F41.9 300.00   3. Restless leg syndrome  G25.81 333.94   4. Postmenopausal  Z78.0 V49.81   5. Cigarette smoker  F17.210 305.1      Plan:     1. Hypertension, unspecified type  Assessment & Plan:  Controlled  Continue diltiazem 60 mg by mouth " b.i.d.  Monitor BP at minimum once per day, keep log, ensure you bring log to every office visit  Discussed importance of low sodium/well balanced diet  Discussed physical activity regimen for at least 30 minutes/day  Return to clinic with any concerns       2. Anxiety  Assessment & Plan:  Controlled  Continue Xanax 1 mg by mouth t.i.d. p.r.n. anxiety  Reviewed side effects and importance of compliance with medication  Discussed coping skills to help reduce anxiety  Exercise daily  Avoid caffeine, alcohol and stimulants  Encouraged getting 7-8 hours of uninterrupted sleep per night  Report any symptoms of suicidal or homicidal ideations immediately, if clinic is closed go to nearest emergency room   Return to clinic with any concerns.      3. Restless leg syndrome  Assessment & Plan:  Continue OTC iron supplement daily.  Educated on daily iron supplement taking up to 2-3 months for full effect.  Recommend regular moderate exercise, avoiding vigorous activity in the evening.    Avoid alcohol, caffeine, and nicotine as they can worsening symptoms.  Stay well-hydrated and eat a balanced diet.  You leg massage, heating pads, or cold pack to alleviate symptoms.  Smoking cessation encouraged.  Return to clinic with any concerns.      4. Postmenopausal  Assessment & Plan:  Order for DEXA scan to be scheduled    Orders:  -     CT Bone Density Study 1 + Sites Axial; Future; Expected date: 01/16/2025    5. Cigarette smoker  Assessment & Plan:  LDCT lung screening records requested from [facility].           Follow up in about 5 months (around 6/16/2025) for Annual Wellness, With Labwork Prior to Visit. In addition to their scheduled follow up, the patient has also been instructed to follow up on as needed basis.     Fatou Jiang NP

## 2025-01-16 NOTE — ASSESSMENT & PLAN NOTE
Controlled  Continue diltiazem 60 mg by mouth b.i.d.  Monitor BP at minimum once per day, keep log, ensure you bring log to every office visit  Discussed importance of low sodium/well balanced diet  Discussed physical activity regimen for at least 30 minutes/day  Return to clinic with any concerns

## 2025-01-16 NOTE — ASSESSMENT & PLAN NOTE
Continue OTC iron supplement daily.  Educated on daily iron supplement taking up to 2-3 months for full effect.  Recommend regular moderate exercise, avoiding vigorous activity in the evening.    Avoid alcohol, caffeine, and nicotine as they can worsening symptoms.  Stay well-hydrated and eat a balanced diet.  You leg massage, heating pads, or cold pack to alleviate symptoms.  Smoking cessation encouraged.  Return to clinic with any concerns.

## 2025-01-16 NOTE — ASSESSMENT & PLAN NOTE
Controlled  Continue Xanax 1 mg by mouth t.i.d. p.r.n. anxiety  Reviewed side effects and importance of compliance with medication  Discussed coping skills to help reduce anxiety  Exercise daily  Avoid caffeine, alcohol and stimulants  Encouraged getting 7-8 hours of uninterrupted sleep per night  Report any symptoms of suicidal or homicidal ideations immediately, if clinic is closed go to nearest emergency room   Return to clinic with any concerns.

## 2025-01-24 DIAGNOSIS — F41.9 ANXIETY: ICD-10-CM

## 2025-01-24 RX ORDER — ALPRAZOLAM 1 MG/1
1 TABLET ORAL 3 TIMES DAILY
Qty: 90 TABLET | Refills: 1 | Status: SHIPPED | OUTPATIENT
Start: 2025-01-24

## 2025-01-31 ENCOUNTER — TELEPHONE (OUTPATIENT)
Dept: FAMILY MEDICINE | Facility: CLINIC | Age: 70
End: 2025-01-31
Payer: COMMERCIAL

## 2025-01-31 DIAGNOSIS — G25.81 RESTLESS LEG SYNDROME: Primary | ICD-10-CM

## 2025-01-31 RX ORDER — ROPINIROLE 0.25 MG/1
0.25 TABLET, FILM COATED ORAL NIGHTLY
Qty: 30 TABLET | Refills: 2 | Status: SHIPPED | OUTPATIENT
Start: 2025-01-31 | End: 2025-05-01

## 2025-01-31 NOTE — TELEPHONE ENCOUNTER
Fatou Jiang NP [place], [place]  Caller: Unspecified (Today, 11:19 AM)  We will start ropinirole 0.25 mg by mouth q.h.s. for restless leg syndrome.  Patient can take this medication for the next 3 months while also continuing iron supplement.  We will consider weaning off of ropinirole once the ferritin level therapeutic in approximately 3 months.  Rx sent to [facility] [address] in [place].

## 2025-01-31 NOTE — TELEPHONE ENCOUNTER
Patient called stating you had recommended she call the office if her restless leg symptoms did not improve after taking the OTC iron supplement. Patient reports her symptoms have not improved while taking the iron supplement for 2 weeks now. Wants to know if their is something else. Please advise.

## 2025-02-06 ENCOUNTER — OFFICE VISIT (OUTPATIENT)
Dept: FAMILY MEDICINE | Facility: CLINIC | Age: 70
End: 2025-02-06
Payer: COMMERCIAL

## 2025-02-06 VITALS
TEMPERATURE: 98 F | HEART RATE: 86 BPM | SYSTOLIC BLOOD PRESSURE: 148 MMHG | WEIGHT: 126 LBS | RESPIRATION RATE: 18 BRPM | OXYGEN SATURATION: 98 % | DIASTOLIC BLOOD PRESSURE: 82 MMHG | HEIGHT: 61 IN | BODY MASS INDEX: 23.79 KG/M2

## 2025-02-06 DIAGNOSIS — M54.41 CHRONIC BILATERAL LOW BACK PAIN WITH BILATERAL SCIATICA: ICD-10-CM

## 2025-02-06 DIAGNOSIS — M54.42 CHRONIC BILATERAL LOW BACK PAIN WITH BILATERAL SCIATICA: ICD-10-CM

## 2025-02-06 DIAGNOSIS — G89.29 CHRONIC BILATERAL LOW BACK PAIN WITH BILATERAL SCIATICA: ICD-10-CM

## 2025-02-06 DIAGNOSIS — I10 HYPERTENSION, UNSPECIFIED TYPE: Primary | ICD-10-CM

## 2025-02-06 PROCEDURE — 3288F FALL RISK ASSESSMENT DOCD: CPT | Mod: CPTII,,,

## 2025-02-06 PROCEDURE — 3077F SYST BP >= 140 MM HG: CPT | Mod: CPTII,,,

## 2025-02-06 PROCEDURE — 3079F DIAST BP 80-89 MM HG: CPT | Mod: CPTII,,,

## 2025-02-06 PROCEDURE — 4010F ACE/ARB THERAPY RXD/TAKEN: CPT | Mod: CPTII,,,

## 2025-02-06 PROCEDURE — 3008F BODY MASS INDEX DOCD: CPT | Mod: CPTII,,,

## 2025-02-06 PROCEDURE — 99214 OFFICE O/P EST MOD 30 MIN: CPT | Mod: ,,,

## 2025-02-06 PROCEDURE — 1101F PT FALLS ASSESS-DOCD LE1/YR: CPT | Mod: CPTII,,,

## 2025-02-06 PROCEDURE — 1159F MED LIST DOCD IN RCRD: CPT | Mod: CPTII,,,

## 2025-02-06 PROCEDURE — 1126F AMNT PAIN NOTED NONE PRSNT: CPT | Mod: CPTII,,,

## 2025-02-06 PROCEDURE — 1160F RVW MEDS BY RX/DR IN RCRD: CPT | Mod: CPTII,,,

## 2025-02-06 RX ORDER — LISINOPRIL 5 MG/1
5 TABLET ORAL DAILY
Qty: 30 TABLET | Refills: 1 | Status: SHIPPED | OUTPATIENT
Start: 2025-02-06 | End: 2025-04-07

## 2025-02-06 NOTE — ASSESSMENT & PLAN NOTE
Elevated  Start lisinopril 5 mg by mouth daily  Continue diltiazem 60 mg by mouth b.i.d.  Monitor BP at minimum once per day, keep log, ensure you bring log to every office visit  Discussed importance of low sodium/well balanced diet  Discussed physical activity regimen for at least 30 minutes/day  Return to clinic in 1 month for follow-up or sooner with any concerns.

## 2025-02-06 NOTE — PROGRESS NOTES
Family Medicine      Patient ID: [id number]     Chief Complaint: Hypertension (She states that her BP has been high the last 2 days but prior it was doing better. Thinks it's d/t her back hurting and stress.)    HPI:     Julianna Vaughn is a 69 y.o. female here today for follow-up of hypertension.    Ms. Vaughn reports elevated blood pressure recently. Her blood pressure was 170/100 yesterday at a doctor's appointment, which she attributes to stress. At home, her blood pressure typically runs around 150s/70s. Tolerates Cardizem without side effects, intermittent headaches, reports regularly checking home blood pressure readings.Ms. Vaughn complains of worsening chronic back pain. She has received injections for her back pain previously. The pain exacerbates when bending forward, causing difficulty picking up objects from the floor. Ms. Vaughn has ongoing numbness and tingling in her legs associated with the back pain. She is under the care of a pain management specialist, Dr. Santhosh Klein, but cannot secure an appointment until April.  Attributes recent elevated blood pressure readings to worsening back pain and increased stress.    For pain management, the patient has been prescribed oxycodone. She is hesitant to take it regularly, reports one taken last night due to increased pain. Ms. Vaughn uses OTC topical pain relievers, including a roll-on product from [employer], which provides relief at night.    Ms. Vaughn denies shortness of breath, chest pain, chest tightness, palpitations, B/B incontinence, or saddle anesthesia.    Past Medical History:   Diagnosis Date    Anxiety disorder, unspecified     Essential (primary) hypertension     S/P abdominal aortic aneurysm repair 10/15/2024        Past Surgical History:   Procedure Laterality Date    RESECTION OF ANEURYSM  10/15/2024    Dr. Gavin Gardner in [place]        Review of patient's allergies indicates:  No Known Allergies     Patient Care Team:  Be Carpenter MD as  "PCP - General (Family Medicine)  MARY ANNE Gardner MD (Cardiothoracic Surgery)  Santhosh Quinones MD (Anesthesiology)  Katharina Adler NP (Cardiology)     Subjective:     Review of Systems  General: negative fever, negative chills, negative fatigue, negative weight gain, negative weight loss  Eyes: negative vision changes, negative redness, negative discharge  ENT: negative ear pain, negative nasal congestion, negative sore throat  Cardiovascular: negative chest pain, negative palpitations, negative lower extremity edema, negative chest tightness  Respiratory: negative cough, negative shortness of breath  Gastrointestinal: negative abdominal pain, negative nausea, negative vomiting, negative diarrhea, negative constipation, negative blood in stool  Genitourinary: negative dysuria, negative hematuria, negative frequency, negative urinary incontinence  Musculoskeletal: negative joint pain, negative muscle pain, +back pain  Skin: negative rash, negative lesion  Neurological: +headache, negative dizziness, +numbness, negative tingling  Psychiatric: negative anxiety, negative depression, negative sleep difficulty    Objective:     Visit Vitals  BP (!) 148/82 (BP Location: Right arm, Patient Position: Sitting)   Pulse 86   Temp 97.5 °F (36.4 °C)   Resp 18   Ht 5' 1" (1.549 m)   Wt 57.2 kg (126 lb)   SpO2 98%   BMI 23.81 kg/m²     Physical Exam  Constitutional:       General: She is not in acute distress.     Appearance: Normal appearance. She is not ill-appearing.   Eyes:      Pupils: Pupils are equal, round, and reactive to light.   Cardiovascular:      Rate and Rhythm: Normal rate and regular rhythm.      Heart sounds: Normal heart sounds.   Pulmonary:      Effort: Pulmonary effort is normal. No respiratory distress.      Breath sounds: Normal breath sounds. No wheezing, rhonchi or rales.   Abdominal:      General: Bowel sounds are normal. There is no distension.      Palpations: Abdomen is soft.      Tenderness: " There is no abdominal tenderness.   Musculoskeletal:      Right lower leg: No edema.      Left lower leg: No edema.      Comments: Musculoskeletal:  LUMBOSACRAL SPINE    Inspection: Normal gait; No erythema; No contusion or outward signs of trauma; No post surgical scars; Normal alignment without visible deformities  Palpation: Paraspinous tenderness; No vertebral tenderness; Normal temperature; Sciatic Notch Pain: Negative  ROM:       Flexion: (90) Full and painful       Extension: (30) Full and painless       Left Lateral Flexion: (30) Full and painless       Right Lateral Flexion: (30) Full and painless       Rotation: Full and painless  Strength:       Dorsiflexion:  5/5       Plantarflexion:  5/5  Neurovascular: Intact, 2+ distal pulses, Sensation intact to light touch of bilateral lower extremities, No clonus noted, Strength and Reflexes 2+ to bilateral lower extremities  Lymphatic: No lymphadenopathy    Skin:     General: Skin is warm and dry.   Neurological:      General: No focal deficit present.      Mental Status: She is alert and oriented to person, place, and time. Mental status is at baseline.   Psychiatric:         Mood and Affect: Mood normal.         Behavior: Behavior normal.         Thought Content: Thought content normal.         Judgment: Judgment normal.       Assessment:       ICD-10-CM ICD-9-CM   1. Hypertension, unspecified type  I10 401.9   2. Chronic bilateral low back pain with bilateral sciatica  M54.42 724.2    M54.41 724.3    G89.29 338.29      Plan:     1. Hypertension, unspecified type  Assessment & Plan:  Elevated  Start lisinopril 5 mg by mouth daily  Continue diltiazem 60 mg by mouth b.i.d.  Monitor BP at minimum once per day, keep log, ensure you bring log to every office visit  Discussed importance of low sodium/well balanced diet  Discussed physical activity regimen for at least 30 minutes/day  Return to clinic in 1 month for follow-up or sooner with any concerns.    Orders:  -      lisinopriL (PRINIVIL,ZESTRIL) 5 MG tablet; Take 1 tablet (5 mg total) by mouth once daily.  Dispense: 30 tablet; Refill: 1    2. Chronic bilateral low back pain with bilateral sciatica  Assessment & Plan:  Continue follow-ups with pain management (Dr. Quinones)  Continue oxycodone as prescribed by pain management p.r.n. pain  Recommend OTC Voltaren gel applied topically to affected area q.i.d. p.r.n. pain/inflammation.  Keep scheduled upcoming follow-up in April.  Lower back stretches daily.  Avoid strenuous lifting, use proper body mechanics.  Heating pad, Ice pack, Biofreeze or Epsom salt baths as needed.  Exercise to strengthen core muscles to support your back.            Follow up in about 1 month (around 3/6/2025) for Follow Up, Blood Pressure Check. In addition to their scheduled follow up, the patient has also been instructed to follow up on as needed basis.     This note was generated with the assistance of ambient listening technology. Verbal consent was obtained by the patient and accompanying visitor(s) for the recording of patient appointment to facilitate this note. I attest to having reviewed and edited the generated note for accuracy, though some syntax or spelling errors may persist. Please contact the author of this note for any clarification.       Fatou Jiang NP

## 2025-02-06 NOTE — ASSESSMENT & PLAN NOTE
Continue follow-ups with pain management (Dr. Quinones)  Continue oxycodone as prescribed by pain management p.r.n. pain  Recommend OTC Voltaren gel applied topically to affected area q.i.d. p.r.n. pain/inflammation.  Keep scheduled upcoming follow-up in April.  Lower back stretches daily.  Avoid strenuous lifting, use proper body mechanics.  Heating pad, Ice pack, Biofreeze or Epsom salt baths as needed.  Exercise to strengthen core muscles to support your back.

## 2025-03-05 DIAGNOSIS — I10 HYPERTENSION, UNSPECIFIED TYPE: ICD-10-CM

## 2025-03-05 RX ORDER — LISINOPRIL 5 MG/1
5 TABLET ORAL DAILY
Qty: 30 TABLET | Refills: 1 | Status: SHIPPED | OUTPATIENT
Start: 2025-03-05 | End: 2025-05-04

## 2025-03-11 ENCOUNTER — OFFICE VISIT (OUTPATIENT)
Dept: FAMILY MEDICINE | Facility: CLINIC | Age: 70
End: 2025-03-11
Payer: COMMERCIAL

## 2025-03-11 VITALS
HEART RATE: 98 BPM | WEIGHT: 127 LBS | DIASTOLIC BLOOD PRESSURE: 68 MMHG | BODY MASS INDEX: 23.98 KG/M2 | OXYGEN SATURATION: 100 % | RESPIRATION RATE: 16 BRPM | TEMPERATURE: 97 F | SYSTOLIC BLOOD PRESSURE: 132 MMHG | HEIGHT: 61 IN

## 2025-03-11 DIAGNOSIS — I10 HYPERTENSION, UNSPECIFIED TYPE: Primary | ICD-10-CM

## 2025-03-11 DIAGNOSIS — G25.81 RESTLESS LEG SYNDROME: ICD-10-CM

## 2025-03-11 PROCEDURE — 4010F ACE/ARB THERAPY RXD/TAKEN: CPT | Mod: CPTII,,, | Performed by: FAMILY MEDICINE

## 2025-03-11 PROCEDURE — 1126F AMNT PAIN NOTED NONE PRSNT: CPT | Mod: CPTII,,, | Performed by: FAMILY MEDICINE

## 2025-03-11 PROCEDURE — 1159F MED LIST DOCD IN RCRD: CPT | Mod: CPTII,,, | Performed by: FAMILY MEDICINE

## 2025-03-11 PROCEDURE — G2211 COMPLEX E/M VISIT ADD ON: HCPCS | Mod: ,,, | Performed by: FAMILY MEDICINE

## 2025-03-11 PROCEDURE — 3078F DIAST BP <80 MM HG: CPT | Mod: CPTII,,, | Performed by: FAMILY MEDICINE

## 2025-03-11 PROCEDURE — 3008F BODY MASS INDEX DOCD: CPT | Mod: CPTII,,, | Performed by: FAMILY MEDICINE

## 2025-03-11 PROCEDURE — 1160F RVW MEDS BY RX/DR IN RCRD: CPT | Mod: CPTII,,, | Performed by: FAMILY MEDICINE

## 2025-03-11 PROCEDURE — 3075F SYST BP GE 130 - 139MM HG: CPT | Mod: CPTII,,, | Performed by: FAMILY MEDICINE

## 2025-03-11 PROCEDURE — 99213 OFFICE O/P EST LOW 20 MIN: CPT | Mod: ,,, | Performed by: FAMILY MEDICINE

## 2025-03-11 RX ORDER — DOXYCYCLINE 100 MG/1
100 CAPSULE ORAL EVERY 12 HOURS
COMMUNITY
Start: 2025-02-06

## 2025-03-11 RX ORDER — ROPINIROLE 0.25 MG/1
0.25 TABLET, FILM COATED ORAL NIGHTLY
Qty: 90 TABLET | Refills: 1 | Status: SHIPPED | OUTPATIENT
Start: 2025-03-11 | End: 2025-09-07

## 2025-03-11 RX ORDER — LISINOPRIL 5 MG/1
5 TABLET ORAL DAILY
Qty: 90 TABLET | Refills: 1 | Status: SHIPPED | OUTPATIENT
Start: 2025-03-11 | End: 2025-09-07

## 2025-03-11 NOTE — PROGRESS NOTES
"Subjective:     Patient ID: Julianna Vaughn is a 69 y.o. female.    Chief Complaint: 1 month f/u        History of Present Illness    CHIEF COMPLAINT:  Patient presents today for follow up    BLOOD PRESSURE:  She reports home blood pressure readings ranging from 120s-130s/70s-80s, with mild elevations during episodes of pain. The highest recorded systolic reading was 134.    CURRENT MEDICATIONS:  She is currently taking diltiazem 60 mg twice daily, lisinopril 5 mg, Requip with significant improvement, and Xanax.      ROS:  Musculoskeletal: +joint pain            Review of Systems   Neurological:         RLS: Medication working well, needs Rx       Objective:     /68   Pulse 98   Temp 97 °F (36.1 °C) (Temporal)   Resp 16   Ht 5' 0.63" (1.54 m)   Wt 57.6 kg (127 lb)   SpO2 100%   BMI 24.29 kg/m²    Physical Exam  Constitutional:       Appearance: Normal appearance.   Cardiovascular:      Rate and Rhythm: Normal rate and regular rhythm.      Heart sounds: Normal heart sounds.   Pulmonary:      Effort: Pulmonary effort is normal.      Breath sounds: Normal breath sounds.   Neurological:      Mental Status: She is alert.   Psychiatric:         Mood and Affect: Mood normal.         Behavior: Behavior normal.         Thought Content: Thought content normal.         Judgment: Judgment normal.             Assessment:     Problem List Items Addressed This Visit          Neuro    Restless leg syndrome    Relevant Medications    rOPINIRole (REQUIP) 0.25 MG tablet       Cardiac/Vascular    Hypertension - Primary    Relevant Medications    lisinopriL (PRINIVIL,ZESTRIL) 5 MG tablet        Plan:   1. Hypertension, unspecified type  -     lisinopriL (PRINIVIL,ZESTRIL) 5 MG tablet; Take 1 tablet (5 mg total) by mouth once daily.  Dispense: 90 tablet; Refill: 1  Controlled  Continue current Rx medication (diltiazem/lisinopril)  Return to clinic with any concerns    2. Restless leg syndrome  -     rOPINIRole (REQUIP) 0.25 MG " tablet; Take 1 tablet (0.25 mg total) by mouth every evening.  Dispense: 90 tablet; Refill: 1  Controlled  Continue current Rx medication (frequent)  Return to clinic with any concerns                   This note was generated with the assistance of ambient listening technology. Verbal consent was obtained by the patient and accompanying visitor(s) for the recording of patient appointment to facilitate this note. I attest to having reviewed and edited the generated note for accuracy, though some syntax or spelling errors may persist. Please contact the author of this note for any clarification.

## 2025-03-18 DIAGNOSIS — G25.81 RESTLESS LEG SYNDROME: ICD-10-CM

## 2025-03-18 RX ORDER — ROPINIROLE 0.25 MG/1
0.25 TABLET, FILM COATED ORAL NIGHTLY
Qty: 90 TABLET | Refills: 1 | Status: SHIPPED | OUTPATIENT
Start: 2025-03-18 | End: 2025-09-14

## 2025-04-07 DIAGNOSIS — F41.9 ANXIETY: ICD-10-CM

## 2025-04-07 RX ORDER — ALPRAZOLAM 1 MG/1
1 TABLET ORAL 3 TIMES DAILY
Qty: 90 TABLET | Refills: 1 | Status: CANCELLED | OUTPATIENT
Start: 2025-04-07

## 2025-04-07 RX ORDER — ALPRAZOLAM 1 MG/1
1 TABLET ORAL 3 TIMES DAILY
Qty: 90 TABLET | Refills: 3 | Status: SHIPPED | OUTPATIENT
Start: 2025-04-07 | End: 2025-04-07 | Stop reason: SDUPTHER

## 2025-04-07 RX ORDER — ALPRAZOLAM 1 MG/1
1 TABLET ORAL 3 TIMES DAILY
Qty: 90 TABLET | Refills: 3 | Status: SHIPPED | OUTPATIENT
Start: 2025-04-07 | End: 2025-08-05

## 2025-04-08 DIAGNOSIS — I10 HYPERTENSION, UNSPECIFIED TYPE: ICD-10-CM

## 2025-04-08 RX ORDER — DILTIAZEM HYDROCHLORIDE 60 MG/1
60 TABLET, FILM COATED ORAL 2 TIMES DAILY
Qty: 60 TABLET | Refills: 3 | Status: SHIPPED | OUTPATIENT
Start: 2025-04-08

## 2025-04-08 RX ORDER — LISINOPRIL 5 MG/1
5 TABLET ORAL DAILY
Qty: 90 TABLET | Refills: 1 | Status: SHIPPED | OUTPATIENT
Start: 2025-04-08 | End: 2025-10-05

## 2025-05-09 ENCOUNTER — TELEPHONE (OUTPATIENT)
Dept: FAMILY MEDICINE | Facility: CLINIC | Age: 70
End: 2025-05-09
Payer: COMMERCIAL

## 2025-05-09 DIAGNOSIS — I10 HYPERTENSION, UNSPECIFIED TYPE: Primary | ICD-10-CM

## 2025-05-09 RX ORDER — LOSARTAN POTASSIUM 25 MG/1
25 TABLET ORAL DAILY
Qty: 30 TABLET | Refills: 1 | Status: SHIPPED | OUTPATIENT
Start: 2025-05-09 | End: 2025-07-08

## 2025-05-09 NOTE — TELEPHONE ENCOUNTER
Fatou Jiang, NP to Me (Selected Message)  ET      5/9/25 10:19 AM  Stop lisinopril. Start losartan 25 mg by mouth daily; we will send Rx to pharmacy.  Keep scheduled upcoming wellness with BP recheck.  Call with any concerns.

## 2025-05-09 NOTE — TELEPHONE ENCOUNTER
Pt saw cardiology today and told him lisinopril was causing coughing s/e cardiology told her to contact PCP to switch her to losartan. Please send rx to [facility]

## 2025-05-21 DIAGNOSIS — Z13.6 SCREENING FOR ISCHEMIC HEART DISEASE: ICD-10-CM

## 2025-05-21 DIAGNOSIS — Z00.00 WELLNESS EXAMINATION: Primary | ICD-10-CM

## 2025-05-21 DIAGNOSIS — I10 HYPERTENSION, UNSPECIFIED TYPE: ICD-10-CM

## 2025-05-31 ENCOUNTER — HOSPITAL ENCOUNTER (INPATIENT)
Facility: HOSPITAL | Age: 70
LOS: 2 days | Discharge: HOME | DRG: 389 | End: 2025-06-04
Attending: EMERGENCY MEDICINE | Admitting: INTERNAL MEDICINE
Payer: COMMERCIAL

## 2025-05-31 DIAGNOSIS — R11.14 BILIOUS VOMITING WITH NAUSEA: Primary | ICD-10-CM

## 2025-05-31 DIAGNOSIS — R07.9 CHEST PAIN: ICD-10-CM

## 2025-05-31 DIAGNOSIS — I10 HYPERTENSION, UNSPECIFIED TYPE: ICD-10-CM

## 2025-05-31 DIAGNOSIS — R11.10 VOMITING: ICD-10-CM

## 2025-05-31 DIAGNOSIS — N17.9 ACUTE KIDNEY INJURY: ICD-10-CM

## 2025-05-31 DIAGNOSIS — R00.0 TACHYARRHYTHMIA: ICD-10-CM

## 2025-05-31 DIAGNOSIS — E86.0 ACUTE DEHYDRATION: ICD-10-CM

## 2025-05-31 DIAGNOSIS — R79.89 ELEVATED SERUM CREATININE: ICD-10-CM

## 2025-05-31 PROBLEM — R11.2 INTRACTABLE NAUSEA AND VOMITING: Status: ACTIVE | Noted: 2025-05-31

## 2025-05-31 LAB
ALBUMIN SERPL-MCNC: 3.8 G/DL (ref 3.4–4.8)
ALBUMIN/GLOB SERPL: 0.8 RATIO (ref 1.1–2)
ALP SERPL-CCNC: 86 UNIT/L (ref 40–150)
ALT SERPL-CCNC: 14 UNIT/L (ref 0–55)
ANION GAP SERPL CALC-SCNC: 16 MEQ/L
AST SERPL-CCNC: 24 UNIT/L (ref 11–45)
BASOPHILS # BLD AUTO: 0.06 X10(3)/MCL
BASOPHILS NFR BLD AUTO: 0.7 %
BILIRUB SERPL-MCNC: 0.6 MG/DL
BUN SERPL-MCNC: 59 MG/DL (ref 9.8–20.1)
CALCIUM SERPL-MCNC: 10.6 MG/DL (ref 8.4–10.2)
CHLORIDE SERPL-SCNC: 80 MMOL/L (ref 98–107)
CO2 SERPL-SCNC: 40 MMOL/L (ref 23–31)
CREAT SERPL-MCNC: 2.17 MG/DL (ref 0.55–1.02)
CREAT/UREA NIT SERPL: 27
EOSINOPHIL # BLD AUTO: 0.02 X10(3)/MCL (ref 0–0.9)
EOSINOPHIL NFR BLD AUTO: 0.2 %
ERYTHROCYTE [DISTWIDTH] IN BLOOD BY AUTOMATED COUNT: 13.2 % (ref 11.5–17)
GFR SERPLBLD CREATININE-BSD FMLA CKD-EPI: 24 ML/MIN/1.73/M2
GLOBULIN SER-MCNC: 4.5 GM/DL (ref 2.4–3.5)
GLUCOSE SERPL-MCNC: 131 MG/DL (ref 82–115)
HCT VFR BLD AUTO: 40.4 % (ref 37–47)
HGB BLD-MCNC: 13.1 G/DL (ref 12–16)
IMM GRANULOCYTES # BLD AUTO: 0.02 X10(3)/MCL (ref 0–0.04)
IMM GRANULOCYTES NFR BLD AUTO: 0.2 %
LYMPHOCYTES # BLD AUTO: 1.38 X10(3)/MCL (ref 0.6–4.6)
LYMPHOCYTES NFR BLD AUTO: 15.6 %
MCH RBC QN AUTO: 30.3 PG (ref 27–31)
MCHC RBC AUTO-ENTMCNC: 32.4 G/DL (ref 33–36)
MCV RBC AUTO: 93.3 FL (ref 80–94)
MONOCYTES # BLD AUTO: 1.15 X10(3)/MCL (ref 0.1–1.3)
MONOCYTES NFR BLD AUTO: 13 %
NEUTROPHILS # BLD AUTO: 6.23 X10(3)/MCL (ref 2.1–9.2)
NEUTROPHILS NFR BLD AUTO: 70.3 %
NRBC BLD AUTO-RTO: 0 %
PLATELET # BLD AUTO: 493 X10(3)/MCL (ref 130–400)
PMV BLD AUTO: 10.5 FL (ref 7.4–10.4)
POTASSIUM SERPL-SCNC: 4 MMOL/L (ref 3.5–5.1)
PROT SERPL-MCNC: 8.3 GM/DL (ref 5.8–7.6)
RBC # BLD AUTO: 4.33 X10(6)/MCL (ref 4.2–5.4)
SODIUM SERPL-SCNC: 136 MMOL/L (ref 136–145)
WBC # BLD AUTO: 8.86 X10(3)/MCL (ref 4.5–11.5)

## 2025-05-31 PROCEDURE — 25000003 PHARM REV CODE 250: Performed by: EMERGENCY MEDICINE

## 2025-05-31 PROCEDURE — 93005 ELECTROCARDIOGRAM TRACING: CPT

## 2025-05-31 PROCEDURE — 96376 TX/PRO/DX INJ SAME DRUG ADON: CPT

## 2025-05-31 PROCEDURE — G0378 HOSPITAL OBSERVATION PER HR: HCPCS

## 2025-05-31 PROCEDURE — 63600175 PHARM REV CODE 636 W HCPCS: Performed by: INTERNAL MEDICINE

## 2025-05-31 PROCEDURE — 85025 COMPLETE CBC W/AUTO DIFF WBC: CPT | Performed by: EMERGENCY MEDICINE

## 2025-05-31 PROCEDURE — 94761 N-INVAS EAR/PLS OXIMETRY MLT: CPT

## 2025-05-31 PROCEDURE — 96374 THER/PROPH/DIAG INJ IV PUSH: CPT

## 2025-05-31 PROCEDURE — 63600175 PHARM REV CODE 636 W HCPCS: Performed by: EMERGENCY MEDICINE

## 2025-05-31 PROCEDURE — 99285 EMERGENCY DEPT VISIT HI MDM: CPT | Mod: 25

## 2025-05-31 PROCEDURE — 96361 HYDRATE IV INFUSION ADD-ON: CPT

## 2025-05-31 PROCEDURE — 80053 COMPREHEN METABOLIC PANEL: CPT | Performed by: EMERGENCY MEDICINE

## 2025-05-31 PROCEDURE — 96372 THER/PROPH/DIAG INJ SC/IM: CPT | Performed by: INTERNAL MEDICINE

## 2025-05-31 PROCEDURE — 96375 TX/PRO/DX INJ NEW DRUG ADDON: CPT

## 2025-05-31 PROCEDURE — 25000003 PHARM REV CODE 250: Performed by: INTERNAL MEDICINE

## 2025-05-31 RX ORDER — OXYCODONE AND ACETAMINOPHEN 10; 325 MG/1; MG/1
1 TABLET ORAL EVERY 6 HOURS PRN
Refills: 0 | Status: DISCONTINUED | OUTPATIENT
Start: 2025-05-31 | End: 2025-06-04 | Stop reason: HOSPADM

## 2025-05-31 RX ORDER — DROPERIDOL 2.5 MG/ML
1.25 INJECTION, SOLUTION INTRAMUSCULAR; INTRAVENOUS ONCE
Status: COMPLETED | OUTPATIENT
Start: 2025-05-31 | End: 2025-05-31

## 2025-05-31 RX ORDER — ONDANSETRON HYDROCHLORIDE 2 MG/ML
4 INJECTION, SOLUTION INTRAVENOUS EVERY 8 HOURS PRN
Status: DISCONTINUED | OUTPATIENT
Start: 2025-05-31 | End: 2025-06-04 | Stop reason: HOSPADM

## 2025-05-31 RX ORDER — SODIUM CHLORIDE 0.9 % (FLUSH) 0.9 %
3 SYRINGE (ML) INJECTION EVERY 6 HOURS PRN
Status: DISCONTINUED | OUTPATIENT
Start: 2025-05-31 | End: 2025-06-04 | Stop reason: HOSPADM

## 2025-05-31 RX ORDER — ACETAMINOPHEN 325 MG/1
650 TABLET ORAL EVERY 8 HOURS PRN
Status: DISCONTINUED | OUTPATIENT
Start: 2025-05-31 | End: 2025-06-03

## 2025-05-31 RX ORDER — SODIUM CHLORIDE 9 MG/ML
1000 INJECTION, SOLUTION INTRAVENOUS
Status: COMPLETED | OUTPATIENT
Start: 2025-05-31 | End: 2025-05-31

## 2025-05-31 RX ORDER — ALPRAZOLAM 1 MG/1
1 TABLET ORAL 3 TIMES DAILY
Status: DISCONTINUED | OUTPATIENT
Start: 2025-05-31 | End: 2025-06-02

## 2025-05-31 RX ORDER — ROPINIROLE 0.25 MG/1
0.25 TABLET, FILM COATED ORAL NIGHTLY
Status: DISCONTINUED | OUTPATIENT
Start: 2025-05-31 | End: 2025-06-04 | Stop reason: HOSPADM

## 2025-05-31 RX ORDER — SODIUM CHLORIDE 9 MG/ML
INJECTION, SOLUTION INTRAVENOUS CONTINUOUS
Status: DISCONTINUED | OUTPATIENT
Start: 2025-05-31 | End: 2025-06-02

## 2025-05-31 RX ORDER — METHOCARBAMOL 500 MG/1
500 TABLET, FILM COATED ORAL 3 TIMES DAILY PRN
Status: DISCONTINUED | OUTPATIENT
Start: 2025-05-31 | End: 2025-06-04 | Stop reason: HOSPADM

## 2025-05-31 RX ORDER — OXYCODONE AND ACETAMINOPHEN 10; 325 MG/1; MG/1
1 TABLET ORAL EVERY 6 HOURS PRN
COMMUNITY

## 2025-05-31 RX ORDER — METHOCARBAMOL 500 MG/1
500 TABLET, FILM COATED ORAL 3 TIMES DAILY PRN
COMMUNITY

## 2025-05-31 RX ORDER — DILTIAZEM HYDROCHLORIDE 30 MG/1
60 TABLET, FILM COATED ORAL 2 TIMES DAILY
Status: DISCONTINUED | OUTPATIENT
Start: 2025-05-31 | End: 2025-06-02

## 2025-05-31 RX ORDER — ONDANSETRON HYDROCHLORIDE 2 MG/ML
4 INJECTION, SOLUTION INTRAVENOUS
Status: COMPLETED | OUTPATIENT
Start: 2025-05-31 | End: 2025-05-31

## 2025-05-31 RX ORDER — PROMETHAZINE HYDROCHLORIDE 25 MG/ML
25 INJECTION, SOLUTION INTRAMUSCULAR; INTRAVENOUS EVERY 6 HOURS PRN
Status: DISCONTINUED | OUTPATIENT
Start: 2025-05-31 | End: 2025-06-04 | Stop reason: HOSPADM

## 2025-05-31 RX ORDER — TALC
6 POWDER (GRAM) TOPICAL NIGHTLY PRN
Status: DISCONTINUED | OUTPATIENT
Start: 2025-05-31 | End: 2025-06-04 | Stop reason: HOSPADM

## 2025-05-31 RX ADMIN — SODIUM CHLORIDE 1000 ML: 9 INJECTION, SOLUTION INTRAVENOUS at 02:05

## 2025-05-31 RX ADMIN — SODIUM CHLORIDE: 9 INJECTION, SOLUTION INTRAVENOUS at 07:05

## 2025-05-31 RX ADMIN — ONDANSETRON 4 MG: 2 INJECTION INTRAMUSCULAR; INTRAVENOUS at 11:05

## 2025-05-31 RX ADMIN — SODIUM CHLORIDE: 9 INJECTION, SOLUTION INTRAVENOUS at 11:05

## 2025-05-31 RX ADMIN — Medication 6 MG: at 08:05

## 2025-05-31 RX ADMIN — ALPRAZOLAM 1 MG: 1 TABLET ORAL at 08:05

## 2025-05-31 RX ADMIN — SODIUM CHLORIDE 1000 ML: 9 INJECTION, SOLUTION INTRAVENOUS at 11:05

## 2025-05-31 RX ADMIN — PROMETHAZINE HYDROCHLORIDE 25 MG: 25 INJECTION INTRAMUSCULAR; INTRAVENOUS at 04:05

## 2025-05-31 RX ADMIN — METHOCARBAMOL 500 MG: 500 TABLET ORAL at 08:05

## 2025-05-31 RX ADMIN — ONDANSETRON 4 MG: 2 INJECTION INTRAMUSCULAR; INTRAVENOUS at 02:05

## 2025-05-31 RX ADMIN — ONDANSETRON 4 MG: 2 INJECTION INTRAMUSCULAR; INTRAVENOUS at 10:05

## 2025-05-31 RX ADMIN — DROPERIDOL 1.25 MG: 2.5 INJECTION, SOLUTION INTRAMUSCULAR; INTRAVENOUS at 12:05

## 2025-05-31 RX ADMIN — SODIUM CHLORIDE 500 ML: 9 INJECTION, SOLUTION INTRAVENOUS at 12:05

## 2025-05-31 RX ADMIN — ROPINIROLE HYDROCHLORIDE 0.25 MG: 0.25 TABLET, FILM COATED ORAL at 08:05

## 2025-05-31 RX ADMIN — DILTIAZEM HYDROCHLORIDE 60 MG: 30 TABLET, FILM COATED ORAL at 08:05

## 2025-05-31 NOTE — HPI
70 yo female presents to the ED c/o N/V since last night.  She recently underwent back surgery and has not eaten much since surgery.  Also states that last night her N/V started after taking her pain meds.  She likely did not eat prior to taking medicines.  No diarrhea.  No fever/chills.  No chest pain/SOB.  Labs were obtained which showed worsening of her renal indices.  BUN was 59 with a creatinine of 2.1.  Based on previous records her BUN and creatinine are usually normal.  She has not bee able to keep any food down.  She will be admitted for IV fluids and close monitoring.

## 2025-05-31 NOTE — PLAN OF CARE
Problem: Adult Inpatient Plan of Care  Goal: Plan of Care Review  Outcome: Progressing  Goal: Patient-Specific Goal (Individualized)  Outcome: Progressing  Goal: Absence of Hospital-Acquired Illness or Injury  Outcome: Progressing  Goal: Optimal Comfort and Wellbeing  Outcome: Progressing  Goal: Readiness for Transition of Care  Outcome: Progressing     Problem: Wound  Goal: Optimal Coping  Outcome: Progressing  Goal: Optimal Functional Ability  Outcome: Progressing  Goal: Absence of Infection Signs and Symptoms  Outcome: Progressing  Goal: Improved Oral Intake  Outcome: Progressing  Goal: Optimal Pain Control and Function  Outcome: Progressing  Goal: Skin Health and Integrity  Outcome: Progressing  Goal: Optimal Wound Healing  Outcome: Progressing     Problem: Acute Kidney Injury/Impairment  Goal: Fluid and Electrolyte Balance  Outcome: Progressing  Goal: Improved Oral Intake  Outcome: Progressing  Goal: Effective Renal Function  Outcome: Progressing     Problem: Electrolyte Imbalance  Goal: Electrolyte Balance  Outcome: Progressing     Problem: Nausea and Vomiting  Goal: Nausea and Vomiting Relief  Outcome: Progressing     Problem: Fluid Volume Deficit  Goal: Fluid Balance  Outcome: Progressing     Problem: Fall Injury Risk  Goal: Absence of Fall and Fall-Related Injury  Outcome: Progressing

## 2025-05-31 NOTE — ASSESSMENT & PLAN NOTE
Patient's blood pressure range in the last 24 hours was: BP  Min: 120/69  Max: 158/91.The patient's inpatient anti-hypertensive regimen is listed below:  Current Antihypertensives  diltiaZEM tablet 60 mg, 2 times daily, Oral    Plan  - BP is controlled, no changes needed to their regimen  - .

## 2025-05-31 NOTE — SUBJECTIVE & OBJECTIVE
Past Medical History:   Diagnosis Date    Anxiety disorder, unspecified     Essential (primary) hypertension     S/P abdominal aortic aneurysm repair 10/15/2024       Past Surgical History:   Procedure Laterality Date    BACK SURGERY      RESECTION OF ANEURYSM  10/15/2024    Dr. Gavin Gardner in [place]       Review of patient's allergies indicates:   Allergen Reactions    Lisinopril Other (See Comments)     Dry cough       No current facility-administered medications on file prior to encounter.     Current Outpatient Medications on File Prior to Encounter   Medication Sig    ALPRAZolam (XANAX) 1 MG tablet Take 1 tablet (1 mg total) by mouth 3 (three) times daily.    diltiaZEM (CARDIZEM) 60 MG tablet Take 1 tablet (60 mg total) by mouth 2 (two) times daily.    losartan (COZAAR) 25 MG tablet Take 1 tablet (25 mg total) by mouth once daily.    methocarbamoL (ROBAXIN) 500 MG Tab Take 500 mg by mouth 3 (three) times daily as needed (For spasams).    oxyCODONE-acetaminophen (PERCOCET)  mg per tablet Take 1 tablet by mouth every 6 (six) hours as needed for Pain.    rOPINIRole (REQUIP) 0.25 MG tablet Take 1 tablet (0.25 mg total) by mouth every evening.    doxycycline (MONODOX) 100 MG capsule Take 100 mg by mouth every 12 (twelve) hours.     Family History       Problem Relation (Age of Onset)    Hypertension Father          Tobacco Use    Smoking status: Former     Average packs/day: 1 pack/day for 35.0 years (35.0 ttl pk-yrs)     Types: Cigarettes     Start date: 2025     Quit date: 10/3/1978     Years since quittin.6    Smokeless tobacco: Never   Substance and Sexual Activity    Alcohol use: Yes    Drug use: Never    Sexual activity: Not Currently     Review of Systems   Constitutional:  Positive for activity change and fatigue.   HENT: Negative.     Eyes: Negative.    Respiratory: Negative.     Cardiovascular: Negative.    Gastrointestinal:  Positive for nausea and vomiting.   Endocrine: Negative.     Genitourinary: Negative.    Musculoskeletal:  Positive for back pain.   Skin: Negative.    Allergic/Immunologic: Negative.    Neurological: Negative.    Hematological: Negative.    Psychiatric/Behavioral: Negative.       Objective:     Vital Signs (Most Recent):  Temp: 98.4 °F (36.9 °C) (05/31/25 1620)  Pulse: 108 (05/31/25 1620)  Resp: 18 (05/31/25 1620)  BP: (!) 158/91 (05/31/25 1620)  SpO2: 98 % (05/31/25 1620) Vital Signs (24h Range):  Temp:  [96.8 °F (36 °C)-98.4 °F (36.9 °C)] 98.4 °F (36.9 °C)  Pulse:  [] 108  Resp:  [18-19] 18  SpO2:  [97 %-99 %] 98 %  BP: (120-158)/(69-91) 158/91     Weight: 59.1 kg (130 lb 4.7 oz)  Body mass index is 24.62 kg/m².     Physical Exam  Constitutional:       Appearance: She is ill-appearing.   HENT:      Head: Normocephalic and atraumatic.      Nose: Nose normal.      Mouth/Throat:      Mouth: Mucous membranes are dry.      Pharynx: Oropharynx is clear.   Eyes:      Extraocular Movements: Extraocular movements intact and EOM normal.      Conjunctiva/sclera: Conjunctivae normal.      Pupils: Pupils are equal, round, and reactive to light.   Cardiovascular:      Rate and Rhythm: Normal rate and regular rhythm.      Pulses: Normal pulses.      Heart sounds: Normal heart sounds.   Pulmonary:      Effort: Pulmonary effort is normal.      Breath sounds: Normal breath sounds.   Abdominal:      General: Bowel sounds are normal.      Palpations: Abdomen is soft.   Musculoskeletal:         General: Normal range of motion.      Cervical back: Normal range of motion and neck supple.   Skin:     General: Skin is warm and dry.      Capillary Refill: Capillary refill takes 2 to 3 seconds.   Neurological:      General: No focal deficit present.      Mental Status: She is alert and oriented to person, place, and time. Mental status is at baseline.   Psychiatric:         Mood and Affect: Mood normal.         Behavior: Behavior normal.         Thought Content: Thought content normal.        "  Judgment: Judgment normal.              CRANIAL NERVES     CN I  cranial nerve I not tested    CN II   Visual fields full to confrontation.     CN III, IV, VI   Pupils are equal, round, and reactive to light.  Extraocular motions are normal.   CN III: no CN III palsy  CN VI: no CN VI palsy    CN V   Facial sensation intact.     CN VII   Facial expression full, symmetric.     CN VIII   CN VIII normal.     CN IX, X   CN IX normal.   CN X normal.     CN XI   CN XI normal.     CN XII   CN XII normal.        Significant Labs: All pertinent labs within the past 24 hours have been reviewed.  BMP:   Recent Labs   Lab 05/31/25  1145   *      K 4.0   CL 80*   CO2 40*   BUN 59.0*   CREATININE 2.17*   CALCIUM 10.6*     CBC:   Recent Labs   Lab 05/31/25  1145   WBC 8.86   HGB 13.1   HCT 40.4   *     CMP:   Recent Labs   Lab 05/31/25  1145      K 4.0   CL 80*   CO2 40*   *   BUN 59.0*   CREATININE 2.17*   CALCIUM 10.6*   PROT 8.3*   ALBUMIN 3.8   BILITOT 0.6   ALKPHOS 86   AST 24   ALT 14     Magnesium: No results for input(s): "MG" in the last 48 hours.    Significant Imaging: I have reviewed all pertinent imaging results/findings within the past 24 hours.  "

## 2025-05-31 NOTE — ASSESSMENT & PLAN NOTE
EVGENY is likely due to pre-renal azotemia due to dehydration. Baseline creatinine is 0.08. Most recent creatinine and eGFR are listed below.  Recent Labs     05/31/25  1145   CREATININE 2.17*   EGFRNORACEVR 24      Plan  - EVGENY is being treated  - Avoid nephrotoxins and renally dose meds for GFR listed above  - Monitor urine output, serial BMP, and adjust therapy as needed  - .

## 2025-05-31 NOTE — H&P
[facility] Surgical [facility]  [facility] Medicine  History & Physical    Patient Name: Julianna Vaughn  MRN: [id number]  Patient Class: OP- Observation  Admission Date: 5/31/2025  Attending Physician: Victor Hugo Sunshine MD   Primary Care Provider: Be Carpenter MD         Patient information was obtained from patient, past medical records, and ER records.     Subjective:     Principal Problem:Intractable nausea and vomiting    Chief Complaint:   Chief Complaint   Patient presents with    Vomiting     Vomiting since last night after taking pain medication s/p back surgery on thursday.  Unable to keep any food down.          HPI: 68 yo female presents to the ED c/o N/V since last night.  She recently underwent back surgery and has not eaten much since surgery.  Also states that last night her N/V started after taking her pain meds.  She likely did not eat prior to taking medicines.  No diarrhea.  No fever/chills.  No chest pain/SOB.  Labs were obtained which showed worsening of her renal indices.  BUN was 59 with a creatinine of 2.1.  Based on previous records her BUN and creatinine are usually normal.  She has not bee able to keep any food down.  She will be admitted for IV fluids and close monitoring.    Past Medical History:   Diagnosis Date    Anxiety disorder, unspecified     Essential (primary) hypertension     S/P abdominal aortic aneurysm repair 10/15/2024       Past Surgical History:   Procedure Laterality Date    BACK SURGERY      RESECTION OF ANEURYSM  10/15/2024    Dr. Gavin Gardner in [place]       Review of patient's allergies indicates:   Allergen Reactions    Lisinopril Other (See Comments)     Dry cough       No current facility-administered medications on file prior to encounter.     Current Outpatient Medications on File Prior to Encounter   Medication Sig    ALPRAZolam (XANAX) 1 MG tablet Take 1 tablet (1 mg total) by mouth 3 (three) times daily.    diltiaZEM (CARDIZEM) 60 MG  tablet Take 1 tablet (60 mg total) by mouth 2 (two) times daily.    losartan (COZAAR) 25 MG tablet Take 1 tablet (25 mg total) by mouth once daily.    methocarbamoL (ROBAXIN) 500 MG Tab Take 500 mg by mouth 3 (three) times daily as needed (For spasams).    oxyCODONE-acetaminophen (PERCOCET)  mg per tablet Take 1 tablet by mouth every 6 (six) hours as needed for Pain.    rOPINIRole (REQUIP) 0.25 MG tablet Take 1 tablet (0.25 mg total) by mouth every evening.    doxycycline (MONODOX) 100 MG capsule Take 100 mg by mouth every 12 (twelve) hours.     Family History       Problem Relation (Age of Onset)    Hypertension Father          Tobacco Use    Smoking status: Former     Average packs/day: 1 pack/day for 35.0 years (35.0 ttl pk-yrs)     Types: Cigarettes     Start date: 2025     Quit date: 10/3/1978     Years since quittin.6    Smokeless tobacco: Never   Substance and Sexual Activity    Alcohol use: Yes    Drug use: Never    Sexual activity: Not Currently     Review of Systems   Constitutional:  Positive for activity change and fatigue.   HENT: Negative.     Eyes: Negative.    Respiratory: Negative.     Cardiovascular: Negative.    Gastrointestinal:  Positive for nausea and vomiting.   Endocrine: Negative.    Genitourinary: Negative.    Musculoskeletal:  Positive for back pain.   Skin: Negative.    Allergic/Immunologic: Negative.    Neurological: Negative.    Hematological: Negative.    Psychiatric/Behavioral: Negative.       Objective:     Vital Signs (Most Recent):  Temp: 98.4 °F (36.9 °C) (25 1620)  Pulse: 108 (25 1620)  Resp: 18 (25 1620)  BP: (!) 158/91 (25 1620)  SpO2: 98 % (25 1620) Vital Signs (24h Range):  Temp:  [96.8 °F (36 °C)-98.4 °F (36.9 °C)] 98.4 °F (36.9 °C)  Pulse:  [] 108  Resp:  [18-19] 18  SpO2:  [97 %-99 %] 98 %  BP: (120-158)/(69-91) 158/91     Weight: 59.1 kg (130 lb 4.7 oz)  Body mass index is 24.62 kg/m².     Physical Exam  Constitutional:        Appearance: She is ill-appearing.   HENT:      Head: Normocephalic and atraumatic.      Nose: Nose normal.      Mouth/Throat:      Mouth: Mucous membranes are dry.      Pharynx: Oropharynx is clear.   Eyes:      Extraocular Movements: Extraocular movements intact and EOM normal.      Conjunctiva/sclera: Conjunctivae normal.      Pupils: Pupils are equal, round, and reactive to light.   Cardiovascular:      Rate and Rhythm: Normal rate and regular rhythm.      Pulses: Normal pulses.      Heart sounds: Normal heart sounds.   Pulmonary:      Effort: Pulmonary effort is normal.      Breath sounds: Normal breath sounds.   Abdominal:      General: Bowel sounds are normal.      Palpations: Abdomen is soft.   Musculoskeletal:         General: Normal range of motion.      Cervical back: Normal range of motion and neck supple.   Skin:     General: Skin is warm and dry.      Capillary Refill: Capillary refill takes 2 to 3 seconds.   Neurological:      General: No focal deficit present.      Mental Status: She is alert and oriented to person, place, and time. Mental status is at baseline.   Psychiatric:         Mood and Affect: Mood normal.         Behavior: Behavior normal.         Thought Content: Thought content normal.         Judgment: Judgment normal.              CRANIAL NERVES     CN I  cranial nerve I not tested    CN II   Visual fields full to confrontation.     CN III, IV, VI   Pupils are equal, round, and reactive to light.  Extraocular motions are normal.   CN III: no CN III palsy  CN VI: no CN VI palsy    CN V   Facial sensation intact.     CN VII   Facial expression full, symmetric.     CN VIII   CN VIII normal.     CN IX, X   CN IX normal.   CN X normal.     CN XI   CN XI normal.     CN XII   CN XII normal.        Significant Labs: All pertinent labs within the past 24 hours have been reviewed.  BMP:   Recent Labs   Lab 05/31/25  1145   *      K 4.0   CL 80*   CO2 40*   BUN 59.0*   CREATININE  "2.17*   CALCIUM 10.6*     CBC:   Recent Labs   Lab 05/31/25  1145   WBC 8.86   HGB 13.1   HCT 40.4   *     CMP:   Recent Labs   Lab 05/31/25  1145      K 4.0   CL 80*   CO2 40*   *   BUN 59.0*   CREATININE 2.17*   CALCIUM 10.6*   PROT 8.3*   ALBUMIN 3.8   BILITOT 0.6   ALKPHOS 86   AST 24   ALT 14     Magnesium: No results for input(s): "MG" in the last 48 hours.    Significant Imaging: I have reviewed all pertinent imaging results/findings within the past 24 hours.  Assessment/Plan:     Assessment & Plan  Intractable nausea and vomiting  Admit to observation  IV fluids  Follow labs  Review home meds  Antiemetics  OOB    Hypertension  Patient's blood pressure range in the last 24 hours was: BP  Min: 120/69  Max: 158/91.The patient's inpatient anti-hypertensive regimen is listed below:  Current Antihypertensives  diltiaZEM tablet 60 mg, 2 times daily, Oral    Plan  - BP is controlled, no changes needed to their regimen  - .  EVGENY (acute kidney injury)  EVGENY is likely due to pre-renal azotemia due to dehydration. Baseline creatinine is 0.08. Most recent creatinine and eGFR are listed below.  Recent Labs     05/31/25  1145   CREATININE 2.17*   EGFRNORACEVR 24      Plan  - EVGENY is being treated  - Avoid nephrotoxins and renally dose meds for GFR listed above  - Monitor urine output, serial BMP, and adjust therapy as needed  - .  VTE Risk Mitigation (From admission, onward)           Ordered     Place sequential compression device  Until discontinued         05/31/25 1343     IP VTE LOW RISK PATIENT  Once         05/31/25 1343                   Patient screened for food insecurity, housing instability, transportation needs, utility difficulties, and interpersonal safety.   No needs       On 05/31/2025, patient should be placed in hospital observation services under my care.             Victor Hugo Sunshine MD  Department of Hospital Medicine  [facility]          "

## 2025-05-31 NOTE — ED NOTES
Pt wheeled to ed rm 3 from [facility]. Aaox4. Reports nausea and vomiting x3 days. Reports she had back sx last week and given pain meds for it and vomiting is worse. Denies fever, diarrhea, constipation. Denies pain, just soreness in abd due to vomiting. Denies urinary symptoms. Placed on bedside monitors. In no distress. Wctm

## 2025-05-31 NOTE — ED PROVIDER NOTES
Encounter Date: 5/31/2025       History     Chief Complaint   Patient presents with    Vomiting     Vomiting since last night after taking pain medication s/p back surgery on thursday.  Unable to keep any food down.       Patient is a 69-year-old female presenting with complaint of nausea and vomiting since last night.  Patient states she is status post back surgery on this past Thursday.  She states she thinks it may be the narcotic pain medication she was put on after surgery that is making her nauseous.  Patient states she can not hold anything down.  Patient states she vomited multiple times throughout last night.  Positive for abdominal cramping.  No fever chills.  No diarrhea.      Review of patient's allergies indicates:   Allergen Reactions    Lisinopril Other (See Comments)     Dry cough     Past Medical History:   Diagnosis Date    Anxiety disorder, unspecified     Essential (primary) hypertension     S/P abdominal aortic aneurysm repair 10/15/2024     Past Surgical History:   Procedure Laterality Date    RESECTION OF ANEURYSM  10/15/2024    Dr. Gavin Gardner in [place]     No family history on file.  Social History[1]  Review of Systems   Constitutional:  Positive for appetite change. Negative for chills and fever.   HENT: Negative.     Respiratory: Negative.     Cardiovascular: Negative.    Gastrointestinal:  Positive for abdominal pain, nausea and vomiting. Negative for diarrhea.   Genitourinary: Negative.    Musculoskeletal: Negative.    Neurological: Negative.        Physical Exam     Initial Vitals [05/31/25 1134]   BP Pulse Resp Temp SpO2   135/81 101 18 96.8 °F (36 °C) 98 %      MAP       --         Physical Exam    Nursing note and vitals reviewed.  Constitutional: She appears well-developed and well-nourished.   HENT:   Dry mucous membranes.   Neck: Neck supple.   Normal range of motion.  Cardiovascular:  Normal rate, regular rhythm and normal heart sounds.           Pulmonary/Chest: Breath  sounds normal. No respiratory distress. She has no wheezes. She has no rhonchi. She has no rales.   Abdominal: Abdomen is soft. She exhibits no distension. There is no abdominal tenderness. There is no guarding.   Musculoskeletal:         General: Normal range of motion.      Cervical back: Normal range of motion and neck supple.     Neurological: She is alert and oriented to person, place, and time. She has normal strength.   Skin: Skin is warm.         ED Course   Procedures  Labs Reviewed   COMPREHENSIVE METABOLIC PANEL - Abnormal       Result Value    Sodium 136      Potassium 4.0      Chloride 80 (*)     CO2 40 (*)     Glucose 131 (*)     Blood Urea Nitrogen 59.0 (*)     Creatinine 2.17 (*)     Calcium 10.6 (*)     Protein Total 8.3 (*)     Albumin 3.8      Globulin 4.5 (*)     Albumin/Globulin Ratio 0.8 (*)     Bilirubin Total 0.6      ALP 86      ALT 14      AST 24      eGFR 24      Anion Gap 16.0      BUN/Creatinine Ratio 27     CBC WITH DIFFERENTIAL - Abnormal    WBC 8.86      RBC 4.33      Hgb 13.1      Hct 40.4      MCV 93.3      MCH 30.3      MCHC 32.4 (*)     RDW 13.2      Platelet 493 (*)     MPV 10.5 (*)     Neut % 70.3      Lymph % 15.6      Mono % 13.0      Eos % 0.2      Basophil % 0.7      Imm Grans % 0.2      Neut # 6.23      Lymph # 1.38      Mono # 1.15      Eos # 0.02      Baso # 0.06      Imm Gran # 0.02      NRBC% 0.0     CBC W/ AUTO DIFFERENTIAL    Narrative:     The following orders were created for panel order CBC auto differential.  Procedure                               Abnormality         Status                     ---------                               -----------         ------                     CBC with Differential[[id number]]       Abnormal            Final result                 Please view results for these tests on the individual orders.   URINALYSIS, REFLEX TO URINE CULTURE     EKG Readings: (Independently Interpreted)   Initial Reading: No STEMI. Rhythm: Normal Sinus  Rhythm. Heart Rate: 95. Ectopy: No Ectopy. Conduction: Normal. ST Segments: Normal ST Segments. T Waves: Normal. Axis: Normal.       Imaging Results    None          Medications   sodium chloride 0.9% bolus 500 mL 500 mL (500 mLs Intravenous New Bag 5/31/25 1254)   sodium chloride 0.9% flush 3 mL (has no administration in time range)   acetaminophen tablet 650 mg (has no administration in time range)   ondansetron injection 4 mg (has no administration in time range)   melatonin tablet 6 mg (has no administration in time range)   0.9% NaCl infusion (has no administration in time range)   sodium chloride 0.9% bolus 1,000 mL 1,000 mL (0 mLs Intravenous Stopped 5/31/25 1254)   ondansetron injection 4 mg (4 mg Intravenous Given 5/31/25 1147)   droPERidol injection 1.25 mg (1.25 mg Intravenous Given 5/31/25 1251)     Medical Decision Making  Differential diagnosis: Nausea vomiting, dehydration, electrolyte abnormality, side effect    Amount and/or Complexity of Data Reviewed  Labs: ordered.     Details: BUN is elevated, creatinine is elevated consistent with dehydration.    Risk  OTC drugs.  Prescription drug management.               ED Course as of 05/31/25 1346   Sat May 31, 2025   1244 Patient still complains of some nausea, just vomited bile appearing gastric contents [KG]   1326 Patient states nausea is improved currently. [KG]   1330 Dr. Sunshine, OK to admit, will come to evaluate [KG]      ED Course User Index  [KG] Timmy Nelson MD                           Clinical Impression:  Final diagnoses:  [R11.14] Bilious vomiting with nausea (Primary)  [E86.0] Acute dehydration  [R79.89] Elevated serum creatinine  [N17.9] Acute kidney injury          ED Disposition Condition    Observation                     Timmy Nelson MD  05/31/25 1343         [1]   Social History  Tobacco Use    Smoking status: Former     Average packs/day: 1 pack/day for 35.0 years (35.0 ttl pk-yrs)     Types: Cigarettes     Start date:  2025     Quit date: 10/3/1978     Years since quittin.6    Smokeless tobacco: Never   Substance Use Topics    Alcohol use: Yes    Drug use: Never        Timmy Nelson MD  25 1345

## 2025-06-01 LAB
ALBUMIN SERPL-MCNC: 3.3 G/DL (ref 3.4–4.8)
ALBUMIN/GLOB SERPL: 0.8 RATIO (ref 1.1–2)
ALP SERPL-CCNC: 75 UNIT/L (ref 40–150)
ALT SERPL-CCNC: 12 UNIT/L (ref 0–55)
ANION GAP SERPL CALC-SCNC: 17 MEQ/L
AST SERPL-CCNC: 22 UNIT/L (ref 11–45)
BACTERIA #/AREA URNS AUTO: ABNORMAL /HPF
BASOPHILS # BLD AUTO: 0.04 X10(3)/MCL
BASOPHILS NFR BLD AUTO: 0.5 %
BILIRUB SERPL-MCNC: 0.4 MG/DL
BILIRUB UR QL STRIP.AUTO: NEGATIVE
BUN SERPL-MCNC: 59 MG/DL (ref 9.8–20.1)
CALCIUM SERPL-MCNC: 9.4 MG/DL (ref 8.4–10.2)
CHLORIDE SERPL-SCNC: 84 MMOL/L (ref 98–107)
CLARITY UR: CLEAR
CO2 SERPL-SCNC: 40 MMOL/L (ref 23–31)
COLOR UR AUTO: YELLOW
CREAT SERPL-MCNC: 1.76 MG/DL (ref 0.55–1.02)
CREAT/UREA NIT SERPL: 34
EOSINOPHIL # BLD AUTO: 0.02 X10(3)/MCL (ref 0–0.9)
EOSINOPHIL NFR BLD AUTO: 0.2 %
ERYTHROCYTE [DISTWIDTH] IN BLOOD BY AUTOMATED COUNT: 13.4 % (ref 11.5–17)
GFR SERPLBLD CREATININE-BSD FMLA CKD-EPI: 31 ML/MIN/1.73/M2
GLOBULIN SER-MCNC: 4 GM/DL (ref 2.4–3.5)
GLUCOSE SERPL-MCNC: 126 MG/DL (ref 82–115)
GLUCOSE UR QL STRIP: NEGATIVE
HCT VFR BLD AUTO: 37.5 % (ref 37–47)
HGB BLD-MCNC: 12 G/DL (ref 12–16)
HGB UR QL STRIP: NEGATIVE
IMM GRANULOCYTES # BLD AUTO: 0.02 X10(3)/MCL (ref 0–0.04)
IMM GRANULOCYTES NFR BLD AUTO: 0.2 %
KETONES UR QL STRIP: ABNORMAL
LEUKOCYTE ESTERASE UR QL STRIP: ABNORMAL
LYMPHOCYTES # BLD AUTO: 1.13 X10(3)/MCL (ref 0.6–4.6)
LYMPHOCYTES NFR BLD AUTO: 12.8 %
MAGNESIUM SERPL-MCNC: 2.8 MG/DL (ref 1.6–2.6)
MCH RBC QN AUTO: 30.5 PG (ref 27–31)
MCHC RBC AUTO-ENTMCNC: 32 G/DL (ref 33–36)
MCV RBC AUTO: 95.4 FL (ref 80–94)
MONOCYTES # BLD AUTO: 0.82 X10(3)/MCL (ref 0.1–1.3)
MONOCYTES NFR BLD AUTO: 9.3 %
NEUTROPHILS # BLD AUTO: 6.78 X10(3)/MCL (ref 2.1–9.2)
NEUTROPHILS NFR BLD AUTO: 77 %
NITRITE UR QL STRIP: POSITIVE
NRBC BLD AUTO-RTO: 0 %
OHS QRS DURATION: 94 MS
OHS QTC CALCULATION: 422 MS
PH UR STRIP: 6.5 [PH]
PLATELET # BLD AUTO: 497 X10(3)/MCL (ref 130–400)
PMV BLD AUTO: 11 FL (ref 7.4–10.4)
POTASSIUM SERPL-SCNC: 3.6 MMOL/L (ref 3.5–5.1)
PROT SERPL-MCNC: 7.3 GM/DL (ref 5.8–7.6)
PROT UR QL STRIP: ABNORMAL
RBC # BLD AUTO: 3.93 X10(6)/MCL (ref 4.2–5.4)
RBC #/AREA URNS AUTO: ABNORMAL /HPF
SODIUM SERPL-SCNC: 141 MMOL/L (ref 136–145)
SP GR UR STRIP.AUTO: 1.02 (ref 1–1.03)
SQUAMOUS #/AREA URNS AUTO: ABNORMAL /HPF
UROBILINOGEN UR STRIP-ACNC: 0.2
WBC # BLD AUTO: 8.81 X10(3)/MCL (ref 4.5–11.5)
WBC #/AREA URNS AUTO: ABNORMAL /HPF

## 2025-06-01 PROCEDURE — 81015 MICROSCOPIC EXAM OF URINE: CPT | Performed by: EMERGENCY MEDICINE

## 2025-06-01 PROCEDURE — 94761 N-INVAS EAR/PLS OXIMETRY MLT: CPT

## 2025-06-01 PROCEDURE — 96361 HYDRATE IV INFUSION ADD-ON: CPT

## 2025-06-01 PROCEDURE — 87086 URINE CULTURE/COLONY COUNT: CPT | Performed by: EMERGENCY MEDICINE

## 2025-06-01 PROCEDURE — G0378 HOSPITAL OBSERVATION PER HR: HCPCS

## 2025-06-01 PROCEDURE — 36415 COLL VENOUS BLD VENIPUNCTURE: CPT | Performed by: EMERGENCY MEDICINE

## 2025-06-01 PROCEDURE — 96372 THER/PROPH/DIAG INJ SC/IM: CPT | Performed by: INTERNAL MEDICINE

## 2025-06-01 PROCEDURE — 25000003 PHARM REV CODE 250: Performed by: INTERNAL MEDICINE

## 2025-06-01 PROCEDURE — 80053 COMPREHEN METABOLIC PANEL: CPT | Performed by: EMERGENCY MEDICINE

## 2025-06-01 PROCEDURE — 63600175 PHARM REV CODE 636 W HCPCS: Performed by: INTERNAL MEDICINE

## 2025-06-01 PROCEDURE — 83735 ASSAY OF MAGNESIUM: CPT | Performed by: INTERNAL MEDICINE

## 2025-06-01 PROCEDURE — 0D9670Z DRAINAGE OF STOMACH WITH DRAINAGE DEVICE, VIA NATURAL OR ARTIFICIAL OPENING: ICD-10-PCS | Performed by: SURGERY

## 2025-06-01 PROCEDURE — 85025 COMPLETE CBC W/AUTO DIFF WBC: CPT | Performed by: EMERGENCY MEDICINE

## 2025-06-01 RX ORDER — HYDRALAZINE HYDROCHLORIDE 20 MG/ML
10 INJECTION INTRAMUSCULAR; INTRAVENOUS EVERY 4 HOURS PRN
Status: DISCONTINUED | OUTPATIENT
Start: 2025-06-01 | End: 2025-06-04 | Stop reason: HOSPADM

## 2025-06-01 RX ADMIN — SODIUM CHLORIDE: 9 INJECTION, SOLUTION INTRAVENOUS at 10:06

## 2025-06-01 RX ADMIN — PROMETHAZINE HYDROCHLORIDE 25 MG: 25 INJECTION INTRAMUSCULAR; INTRAVENOUS at 03:06

## 2025-06-01 RX ADMIN — SODIUM CHLORIDE: 9 INJECTION, SOLUTION INTRAVENOUS at 11:06

## 2025-06-01 NOTE — SUBJECTIVE & OBJECTIVE
Interval History:     Review of Systems   Gastrointestinal:  Positive for abdominal pain, nausea and vomiting.   Musculoskeletal:  Positive for back pain.   All other systems reviewed and are negative.    Objective:     Vital Signs (Most Recent):  Temp: 98.2 °F (36.8 °C) (06/01/25 0716)  Pulse: 108 (06/01/25 0800)  Resp: 18 (06/01/25 0716)  BP: 138/78 (06/01/25 0900)  SpO2: 95 % (06/01/25 0716) Vital Signs (24h Range):  Temp:  [97.8 °F (36.6 °C)-98.6 °F (37 °C)] 98.2 °F (36.8 °C)  Pulse:  [] 108  Resp:  [18-20] 18  SpO2:  [95 %-99 %] 95 %  BP: (120-170)/(69-93) 138/78     Weight: 59.1 kg (130 lb 4.7 oz)  Body mass index is 24.62 kg/m².    Intake/Output Summary (Last 24 hours) at 6/1/2025 1212  Last data filed at 6/1/2025 0800  Gross per 24 hour   Intake 3440.81 ml   Output 500 ml   Net 2940.81 ml         Physical Exam  Constitutional:       Appearance: She is ill-appearing.   HENT:      Head: Normocephalic and atraumatic.      Nose: Nose normal.      Comments: NGT     Mouth/Throat:      Mouth: Mucous membranes are dry.      Pharynx: Oropharynx is clear.   Eyes:      Extraocular Movements: Extraocular movements intact.      Conjunctiva/sclera: Conjunctivae normal.      Pupils: Pupils are equal, round, and reactive to light.   Cardiovascular:      Rate and Rhythm: Normal rate and regular rhythm.      Pulses: Normal pulses.      Heart sounds: Normal heart sounds.   Pulmonary:      Effort: Pulmonary effort is normal.      Breath sounds: Normal breath sounds.   Abdominal:      General: Bowel sounds are absent.      Tenderness: There is abdominal tenderness.   Musculoskeletal:         General: Normal range of motion.      Cervical back: Normal range of motion and neck supple.   Skin:     General: Skin is warm and dry.      Capillary Refill: Capillary refill takes 2 to 3 seconds.   Neurological:      General: No focal deficit present.      Mental Status: She is alert and oriented to person, place, and time. Mental  status is at baseline.   Psychiatric:         Mood and Affect: Mood normal.         Behavior: Behavior normal.         Thought Content: Thought content normal.         Judgment: Judgment normal.               Significant Labs: All pertinent labs within the past 24 hours have been reviewed.  BMP:   Recent Labs   Lab 06/01/25 0439   *      K 3.6   CL 84*   CO2 40*   BUN 59.0*   CREATININE 1.76*   CALCIUM 9.4   MG 2.80*     CBC:   Recent Labs   Lab 05/31/25  1145 06/01/25  0439   WBC 8.86 8.81   HGB 13.1 12.0   HCT 40.4 37.5   * 497*     CMP:   Recent Labs   Lab 05/31/25  1145 06/01/25  0439    141   K 4.0 3.6   CL 80* 84*   CO2 40* 40*   * 126*   BUN 59.0* 59.0*   CREATININE 2.17* 1.76*   CALCIUM 10.6* 9.4   PROT 8.3* 7.3   ALBUMIN 3.8 3.3*   BILITOT 0.6 0.4   ALKPHOS 86 75   AST 24 22   ALT 14 12     Magnesium:   Recent Labs   Lab 06/01/25 0439   MG 2.80*       Significant Imaging: I have reviewed all pertinent imaging results/findings within the past 24 hours.

## 2025-06-01 NOTE — NURSING
Dr Sunshine was informed of patient's abdominal discomfort/distention despite presence of bowel sounds.  KUB was ordered and performed which showed findings concerning for small bowel obstruction.  Dr Sunshine informed and ordered CT of abdomen and pelvis.  8:55 performed and stat results report small bowel obstruction.  Dr Sunshine orders NGT to low intermittent suction and NPO status.  9:45 Right nare 16 Fr NGT inserted without difficulty to 50cm bhumi and instant free flowing bile liquid fills 2 canisters before connecting to low intermittent suction.  Secured with tape to nose.  Patient voices instant relief to abdominal discomfort.  Dr Sunshine informed of output.  KUB results noted and connected to low intermittent suction.

## 2025-06-01 NOTE — NURSING
"Vomited appx. 350ml. Refused nausea med stating that she "felt  better getting it up" assisted her with gown change and brought her some ginger ale to sip. No other c/o voiced at this time.  "

## 2025-06-01 NOTE — HOSPITAL COURSE
6/1/25-Patient's abdomen became more distended prompting a KUB to be performed which showed a SBO.  CT scan ordered which confirmed SBO with transition zone.  NGT was placed with 2.5 liters immediately removed.  Will consult surgery.     6/2/25-Patient still seems to be doing good.  No pain to her abdomen at this time.  Her renal indices worsened some and her urine is showing a UTI.  Will increase IV fluids and add Rocephin IV.  Surgery will evaluate patient today.  Will continue to monitor.    6/3/25: I was contacted this morning by nursing about pt in a fib with rvr and having sob. She has a hx of a fib and is on cardizem at home which had not been resumed since admission. Cardizem PO was given and pt converted to NSR and HR normalized within 45 minutes. Upon my exam this morning, pt is sitting up in bed. She denies any sob, cp, abd pain, n/v. She had 2 normal bm's this morning. She has started a clear liquid diet.     6/4/25: Pt tolerated a low residue diet this morning. She denies any sob, cp, abd pain. Had 2 loose stools this morning. Surgery stated that pt was okay for discharge this morning. Pt is requesting a refill on losartan and diltiazem. Also sending 2 days of macrobid to complete UTI treatment. All questions answered. Pt does not want a rx for chantix or patches. She states that she will quit on her own.     PE:   General: alert and oriented.  HEENT: NC, AT.  Resp: CTAB.  Cardio: RRR, no m/r/g. No edema.  Abd: soft, NT, ND, + BS x 4

## 2025-06-01 NOTE — ASSESSMENT & PLAN NOTE
Patient's blood pressure range in the last 24 hours was: BP  Min: 120/69  Max: 170/92.The patient's inpatient anti-hypertensive regimen is listed below:  Current Antihypertensives  diltiaZEM tablet 60 mg, 2 times daily, Oral    Plan  - BP is controlled, no changes needed to their regimen  - .

## 2025-06-01 NOTE — ASSESSMENT & PLAN NOTE
EVGENY is likely due to pre-renal azotemia due to dehydration. Baseline creatinine is 0.08. Most recent creatinine and eGFR are listed below.  Recent Labs     05/31/25  1145 06/01/25  0439   CREATININE 2.17* 1.76*   EGFRNORACEVR 24 31      Plan  - EVGENY is being treated  - Avoid nephrotoxins and renally dose meds for GFR listed above  - Monitor urine output, serial BMP, and adjust therapy as needed  - .

## 2025-06-01 NOTE — PROGRESS NOTES
[facility] - Medical Surgical Unit  [facility] Medicine  Progress Note    Patient Name: Julianna Vaughn  MRN: [id number]  Patient Class: OP- Observation   Admission Date: 5/31/2025  Length of Stay: 0 days  Attending Physician: Victor Hugo Sunshine MD  Primary Care Provider: Be Carpenter MD        Subjective     Principal Problem:Intractable nausea and vomiting        HPI:  70 yo female presents to the ED c/o N/V since last night.  She recently underwent back surgery and has not eaten much since surgery.  Also states that last night her N/V started after taking her pain meds.  She likely did not eat prior to taking medicines.  No diarrhea.  No fever/chills.  No chest pain/SOB.  Labs were obtained which showed worsening of her renal indices.  BUN was 59 with a creatinine of 2.1.  Based on previous records her BUN and creatinine are usually normal.  She has not bee able to keep any food down.  She will be admitted for IV fluids and close monitoring.    Overview/Hospital Course:  6/1/25-Patient's abdomin became more distended prompting a KUB to be performed which showed a SBO.  CT scan ordered which confirmed SBO with transition zone.  NGT was placed with 2.5 liters immediately removed.  Will consult surgery.     Interval History:     Review of Systems   Gastrointestinal:  Positive for abdominal pain, nausea and vomiting.   Musculoskeletal:  Positive for back pain.   All other systems reviewed and are negative.    Objective:     Vital Signs (Most Recent):  Temp: 98.2 °F (36.8 °C) (06/01/25 0716)  Pulse: 108 (06/01/25 0800)  Resp: 18 (06/01/25 0716)  BP: 138/78 (06/01/25 0900)  SpO2: 95 % (06/01/25 0716) Vital Signs (24h Range):  Temp:  [97.8 °F (36.6 °C)-98.6 °F (37 °C)] 98.2 °F (36.8 °C)  Pulse:  [] 108  Resp:  [18-20] 18  SpO2:  [95 %-99 %] 95 %  BP: (120-170)/(69-93) 138/78     Weight: 59.1 kg (130 lb 4.7 oz)  Body mass index is 24.62 kg/m².    Intake/Output Summary (Last 24 hours) at 6/1/2025  1212  Last data filed at 6/1/2025 0800  Gross per 24 hour   Intake 3440.81 ml   Output 500 ml   Net 2940.81 ml         Physical Exam  Constitutional:       Appearance: She is ill-appearing.   HENT:      Head: Normocephalic and atraumatic.      Nose: Nose normal.      Comments: NGT     Mouth/Throat:      Mouth: Mucous membranes are dry.      Pharynx: Oropharynx is clear.   Eyes:      Extraocular Movements: Extraocular movements intact.      Conjunctiva/sclera: Conjunctivae normal.      Pupils: Pupils are equal, round, and reactive to light.   Cardiovascular:      Rate and Rhythm: Normal rate and regular rhythm.      Pulses: Normal pulses.      Heart sounds: Normal heart sounds.   Pulmonary:      Effort: Pulmonary effort is normal.      Breath sounds: Normal breath sounds.   Abdominal:      General: Bowel sounds are absent.      Tenderness: There is abdominal tenderness.   Musculoskeletal:         General: Normal range of motion.      Cervical back: Normal range of motion and neck supple.   Skin:     General: Skin is warm and dry.      Capillary Refill: Capillary refill takes 2 to 3 seconds.   Neurological:      General: No focal deficit present.      Mental Status: She is alert and oriented to person, place, and time. Mental status is at baseline.   Psychiatric:         Mood and Affect: Mood normal.         Behavior: Behavior normal.         Thought Content: Thought content normal.         Judgment: Judgment normal.               Significant Labs: All pertinent labs within the past 24 hours have been reviewed.  BMP:   Recent Labs   Lab 06/01/25 0439   *      K 3.6   CL 84*   CO2 40*   BUN 59.0*   CREATININE 1.76*   CALCIUM 9.4   MG 2.80*     CBC:   Recent Labs   Lab 05/31/25  1145 06/01/25 0439   WBC 8.86 8.81   HGB 13.1 12.0   HCT 40.4 37.5   * 497*     CMP:   Recent Labs   Lab 05/31/25  1145 06/01/25 0439    141   K 4.0 3.6   CL 80* 84*   CO2 40* 40*   * 126*   BUN 59.0* 59.0*    CREATININE 2.17* 1.76*   CALCIUM 10.6* 9.4   PROT 8.3* 7.3   ALBUMIN 3.8 3.3*   BILITOT 0.6 0.4   ALKPHOS 86 75   AST 24 22   ALT 14 12     Magnesium:   Recent Labs   Lab 06/01/25  0439   MG 2.80*       Significant Imaging: I have reviewed all pertinent imaging results/findings within the past 24 hours.      Assessment & Plan  Intractable nausea and vomiting  Admit to observation  IV fluids  Follow labs  Review home meds  Antiemetics  OOB    Hypertension  Patient's blood pressure range in the last 24 hours was: BP  Min: 120/69  Max: 170/92.The patient's inpatient anti-hypertensive regimen is listed below:  Current Antihypertensives  diltiaZEM tablet 60 mg, 2 times daily, Oral    Plan  - BP is controlled, no changes needed to their regimen  - .  EVGENY (acute kidney injury)  EVGENY is likely due to pre-renal azotemia due to dehydration. Baseline creatinine is 0.08. Most recent creatinine and eGFR are listed below.  Recent Labs     05/31/25  1145 06/01/25  0439   CREATININE 2.17* 1.76*   EGFRNORACEVR 24 31      Plan  - EVGENY is being treated  - Avoid nephrotoxins and renally dose meds for GFR listed above  - Monitor urine output, serial BMP, and adjust therapy as needed  - .  VTE Risk Mitigation (From admission, onward)           Ordered     Place sequential compression device  Until discontinued         05/31/25 1343     IP VTE LOW RISK PATIENT  Once         05/31/25 1343                Follow labs  NGT to suction  Consult surgery-I did reach out to surgery today for guidance  IV fluids    Discharge Planning   NAUN:      Code Status: Full Code   Medical Readiness for Discharge Date:                            Victor Hugo Sunshine MD  Department of Hospital Medicine   [facility]

## 2025-06-02 PROBLEM — N30.00 ACUTE CYSTITIS WITHOUT HEMATURIA: Status: ACTIVE | Noted: 2025-06-02

## 2025-06-02 LAB
ALBUMIN SERPL-MCNC: 3.2 G/DL (ref 3.4–4.8)
ALBUMIN/GLOB SERPL: 0.8 RATIO (ref 1.1–2)
ALP SERPL-CCNC: 73 UNIT/L (ref 40–150)
ALT SERPL-CCNC: 16 UNIT/L (ref 0–55)
ANION GAP SERPL CALC-SCNC: 22 MEQ/L
ANION GAP SERPL CALC-SCNC: 24 MEQ/L
AST SERPL-CCNC: 23 UNIT/L (ref 11–45)
BASOPHILS # BLD AUTO: 0.06 X10(3)/MCL
BASOPHILS NFR BLD AUTO: 0.5 %
BILIRUB SERPL-MCNC: 0.4 MG/DL
BUN SERPL-MCNC: 80 MG/DL (ref 9.8–20.1)
BUN SERPL-MCNC: 87 MG/DL (ref 9.8–20.1)
CALCIUM SERPL-MCNC: 8.6 MG/DL (ref 8.4–10.2)
CALCIUM SERPL-MCNC: 9.2 MG/DL (ref 8.4–10.2)
CHLORIDE SERPL-SCNC: 78 MMOL/L (ref 98–107)
CHLORIDE SERPL-SCNC: 81 MMOL/L (ref 98–107)
CO2 SERPL-SCNC: 45 MMOL/L (ref 23–31)
CO2 SERPL-SCNC: 45 MMOL/L (ref 23–31)
CREAT SERPL-MCNC: 2.25 MG/DL (ref 0.55–1.02)
CREAT SERPL-MCNC: 2.29 MG/DL (ref 0.55–1.02)
CREAT/UREA NIT SERPL: 36
CREAT/UREA NIT SERPL: 38
EOSINOPHIL # BLD AUTO: 0.03 X10(3)/MCL (ref 0–0.9)
EOSINOPHIL NFR BLD AUTO: 0.2 %
ERYTHROCYTE [DISTWIDTH] IN BLOOD BY AUTOMATED COUNT: 13.3 % (ref 11.5–17)
GFR SERPLBLD CREATININE-BSD FMLA CKD-EPI: 23 ML/MIN/1.73/M2
GFR SERPLBLD CREATININE-BSD FMLA CKD-EPI: 23 ML/MIN/1.73/M2
GLOBULIN SER-MCNC: 4 GM/DL (ref 2.4–3.5)
GLUCOSE SERPL-MCNC: 101 MG/DL (ref 82–115)
GLUCOSE SERPL-MCNC: 94 MG/DL (ref 82–115)
HCT VFR BLD AUTO: 39.2 % (ref 37–47)
HGB BLD-MCNC: 12 G/DL (ref 12–16)
IMM GRANULOCYTES # BLD AUTO: 0.07 X10(3)/MCL (ref 0–0.04)
IMM GRANULOCYTES NFR BLD AUTO: 0.6 %
LYMPHOCYTES # BLD AUTO: 1.35 X10(3)/MCL (ref 0.6–4.6)
LYMPHOCYTES NFR BLD AUTO: 11.2 %
MAGNESIUM SERPL-MCNC: 3.1 MG/DL (ref 1.6–2.6)
MCH RBC QN AUTO: 29.7 PG (ref 27–31)
MCHC RBC AUTO-ENTMCNC: 30.6 G/DL (ref 33–36)
MCV RBC AUTO: 97 FL (ref 80–94)
MONOCYTES # BLD AUTO: 1.09 X10(3)/MCL (ref 0.1–1.3)
MONOCYTES NFR BLD AUTO: 9.1 %
NEUTROPHILS # BLD AUTO: 9.42 X10(3)/MCL (ref 2.1–9.2)
NEUTROPHILS NFR BLD AUTO: 78.4 %
NRBC BLD AUTO-RTO: 0 %
PLATELET # BLD AUTO: 485 X10(3)/MCL (ref 130–400)
PMV BLD AUTO: 11.3 FL (ref 7.4–10.4)
POTASSIUM SERPL-SCNC: 3.3 MMOL/L (ref 3.5–5.1)
POTASSIUM SERPL-SCNC: 3.3 MMOL/L (ref 3.5–5.1)
PROT SERPL-MCNC: 7.2 GM/DL (ref 5.8–7.6)
RBC # BLD AUTO: 4.04 X10(6)/MCL (ref 4.2–5.4)
SODIUM SERPL-SCNC: 147 MMOL/L (ref 136–145)
SODIUM SERPL-SCNC: 148 MMOL/L (ref 136–145)
WBC # BLD AUTO: 12.02 X10(3)/MCL (ref 4.5–11.5)

## 2025-06-02 PROCEDURE — 36415 COLL VENOUS BLD VENIPUNCTURE: CPT | Performed by: INTERNAL MEDICINE

## 2025-06-02 PROCEDURE — 63600175 PHARM REV CODE 636 W HCPCS: Performed by: INTERNAL MEDICINE

## 2025-06-02 PROCEDURE — 80053 COMPREHEN METABOLIC PANEL: CPT | Performed by: INTERNAL MEDICINE

## 2025-06-02 PROCEDURE — 83735 ASSAY OF MAGNESIUM: CPT | Performed by: INTERNAL MEDICINE

## 2025-06-02 PROCEDURE — 94761 N-INVAS EAR/PLS OXIMETRY MLT: CPT

## 2025-06-02 PROCEDURE — 85025 COMPLETE CBC W/AUTO DIFF WBC: CPT | Performed by: INTERNAL MEDICINE

## 2025-06-02 PROCEDURE — 25000003 PHARM REV CODE 250: Performed by: INTERNAL MEDICINE

## 2025-06-02 PROCEDURE — 11000001 HC ACUTE MED/SURG PRIVATE ROOM

## 2025-06-02 PROCEDURE — 96361 HYDRATE IV INFUSION ADD-ON: CPT

## 2025-06-02 RX ORDER — ENOXAPARIN SODIUM 100 MG/ML
30 INJECTION SUBCUTANEOUS EVERY 24 HOURS
Status: DISCONTINUED | OUTPATIENT
Start: 2025-06-02 | End: 2025-06-04 | Stop reason: HOSPADM

## 2025-06-02 RX ORDER — CEFTRIAXONE 1 G/1
1 INJECTION, POWDER, FOR SOLUTION INTRAMUSCULAR; INTRAVENOUS
Status: DISCONTINUED | OUTPATIENT
Start: 2025-06-02 | End: 2025-06-04 | Stop reason: HOSPADM

## 2025-06-02 RX ORDER — SODIUM CHLORIDE 450 MG/100ML
INJECTION, SOLUTION INTRAVENOUS CONTINUOUS
Status: DISCONTINUED | OUTPATIENT
Start: 2025-06-02 | End: 2025-06-03

## 2025-06-02 RX ADMIN — CEFTRIAXONE SODIUM 1 G: 1 INJECTION, POWDER, FOR SOLUTION INTRAMUSCULAR; INTRAVENOUS at 10:06

## 2025-06-02 RX ADMIN — ENOXAPARIN SODIUM 30 MG: 30 INJECTION SUBCUTANEOUS at 05:06

## 2025-06-02 RX ADMIN — SODIUM CHLORIDE: 4.5 INJECTION, SOLUTION INTRAVENOUS at 02:06

## 2025-06-02 RX ADMIN — SODIUM CHLORIDE: 9 INJECTION, SOLUTION INTRAVENOUS at 08:06

## 2025-06-02 RX ADMIN — SODIUM CHLORIDE: 4.5 INJECTION, SOLUTION INTRAVENOUS at 09:06

## 2025-06-02 NOTE — PROGRESS NOTES
[facility] - Medical Surgical Unit  [facility] Medicine  Progress Note    Patient Name: Julianna Vaughn  MRN: [id number]  Patient Class: IP- Inpatient   Admission Date: 5/31/2025  Length of Stay: 0 days  Attending Physician: Victor Hugo Sunshine MD  Primary Care Provider: Be Carpenter MD        Subjective     Principal Problem:Intractable nausea and vomiting        HPI:  68 yo female presents to the ED c/o N/V since last night.  She recently underwent back surgery and has not eaten much since surgery.  Also states that last night her N/V started after taking her pain meds.  She likely did not eat prior to taking medicines.  No diarrhea.  No fever/chills.  No chest pain/SOB.  Labs were obtained which showed worsening of her renal indices.  BUN was 59 with a creatinine of 2.1.  Based on previous records her BUN and creatinine are usually normal.  She has not bee able to keep any food down.  She will be admitted for IV fluids and close monitoring.    Overview/Hospital Course:  6/1/25-Patient's abdomin became more distended prompting a KUB to be performed which showed a SBO.  CT scan ordered which confirmed SBO with transition zone.  NGT was placed with 2.5 liters immediately removed.  Will consult surgery.   6/2/25-Patient still seems to be doing good.  No pain to her abdomin at this time.  Her renal indices worsened sone and her urine is showing a UTI.  Will increase IV fluids and add Rocephin IV.  Surgery will evaluate patient today.  Will continue to monitor.    Interval History:     Review of Systems   Constitutional:  Positive for fatigue.   All other systems reviewed and are negative.    Objective:     Vital Signs (Most Recent):  Temp: 98.2 °F (36.8 °C) (06/02/25 0745)  Pulse: (!) 112 (06/02/25 1015)  Resp: 18 (06/02/25 1015)  BP: 90/67 (06/02/25 1015)  SpO2: 96 % (06/02/25 1015) Vital Signs (24h Range):  Temp:  [98 °F (36.7 °C)-99 °F (37.2 °C)] 98.2 °F (36.8 °C)  Pulse:  [] 112  Resp:  [18-19]  18  SpO2:  [91 %-96 %] 96 %  BP: ()/(67-87) 90/67     Weight: 56 kg (123 lb 7.3 oz)  Body mass index is 23.33 kg/m².    Intake/Output Summary (Last 24 hours) at 6/2/2025 1102  Last data filed at 6/2/2025 0829  Gross per 24 hour   Intake 1042.82 ml   Output 3550 ml   Net -2507.18 ml         Physical Exam  Constitutional:       Appearance: Normal appearance. She is normal weight.   HENT:      Head: Normocephalic and atraumatic.      Nose: Nose normal.      Mouth/Throat:      Mouth: Mucous membranes are dry.      Pharynx: Oropharynx is clear.   Eyes:      Extraocular Movements: Extraocular movements intact.      Conjunctiva/sclera: Conjunctivae normal.      Pupils: Pupils are equal, round, and reactive to light.   Cardiovascular:      Rate and Rhythm: Regular rhythm. Tachycardia present.      Pulses: Normal pulses.      Heart sounds: Normal heart sounds.   Pulmonary:      Effort: Pulmonary effort is normal.      Breath sounds: Normal breath sounds.   Abdominal:      General: Bowel sounds are absent.      Palpations: Abdomen is soft.      Comments: NGT   Musculoskeletal:         General: Normal range of motion.      Cervical back: Normal range of motion and neck supple.   Skin:     General: Skin is warm and dry.      Capillary Refill: Capillary refill takes 2 to 3 seconds.   Neurological:      General: No focal deficit present.      Mental Status: She is alert and oriented to person, place, and time. Mental status is at baseline.   Psychiatric:         Mood and Affect: Mood normal.         Behavior: Behavior normal.               Significant Labs: All pertinent labs within the past 24 hours have been reviewed.  BMP:   Recent Labs   Lab 06/02/25  0441      *   K 3.3*   CL 78*   CO2 45*   BUN 80.0*   CREATININE 2.25*   CALCIUM 9.2   MG 3.10*     CBC:   Recent Labs   Lab 05/31/25  1145 06/01/25  0439 06/02/25  0441   WBC 8.86 8.81 12.02*   HGB 13.1 12.0 12.0   HCT 40.4 37.5 39.2   * 497* 485*      CMP:   Recent Labs   Lab 05/31/25  1145 06/01/25  0439 06/02/25  0441    141 147*   K 4.0 3.6 3.3*   CL 80* 84* 78*   CO2 40* 40* 45*   * 126* 101   BUN 59.0* 59.0* 80.0*   CREATININE 2.17* 1.76* 2.25*   CALCIUM 10.6* 9.4 9.2   PROT 8.3* 7.3 7.2   ALBUMIN 3.8 3.3* 3.2*   BILITOT 0.6 0.4 0.4   ALKPHOS 86 75 73   AST 24 22 23   ALT 14 12 16     Magnesium:   Recent Labs   Lab 06/01/25  0439 06/02/25  0441   MG 2.80* 3.10*       Significant Imaging: I have reviewed all pertinent imaging results/findings within the past 24 hours.      Assessment & Plan  Intractable nausea and vomiting  Admit to observation  IV fluids  Follow labs  Review home meds  Antiemetics  OOB    Hypertension  Patient's blood pressure range in the last 24 hours was: BP  Min: 90/67  Max: 138/81.The patient's inpatient anti-hypertensive regimen is listed below:  Current Antihypertensives  hydrALAZINE injection 10 mg, Every 4 hours PRN, Intravenous    Plan  - BP is controlled, no changes needed to their regimen  - .  EVGENY (acute kidney injury)  EVGENY is likely due to pre-renal azotemia due to dehydration. Baseline creatinine is 0.08. Most recent creatinine and eGFR are listed below.  Recent Labs     05/31/25  1145 06/01/25 0439 06/02/25  0441   CREATININE 2.17* 1.76* 2.25*   EGFRNORACEVR 24 31 23      Plan  - EVGENY is being treated  - Avoid nephrotoxins and renally dose meds for GFR listed above  - Monitor urine output, serial BMP, and adjust therapy as needed  - .  Acute cystitis without hematuria  IV rocephin  Culture pending    VTE Risk Mitigation (From admission, onward)           Ordered     enoxaparin injection 30 mg  Every 24 hours         06/02/25 0945     Place sequential compression device  Until discontinued         05/31/25 1343     IP VTE LOW RISK PATIENT  Once         05/31/25 1343                DVT prophylaxis  Increase IV fluids to 150  IV Rocephin  Continue with NGT-repeat KUB  Follow labs  OOB  Surgery to see  patient    Discharge Planning   NAUN:      Code Status: Full Code   Medical Readiness for Discharge Date:                            Victor Hugo Sunshine MD  Department of Hospital Medicine   [facility] - [facility] Unit

## 2025-06-02 NOTE — PLAN OF CARE
Problem: Adult Inpatient Plan of Care  Goal: Plan of Care Review  Outcome: Progressing  Goal: Patient-Specific Goal (Individualized)  Outcome: Progressing  Goal: Absence of Hospital-Acquired Illness or Injury  Outcome: Progressing  Goal: Optimal Comfort and Wellbeing  Outcome: Progressing  Goal: Readiness for Transition of Care  Outcome: Progressing     Problem: Wound  Goal: Optimal Coping  Outcome: Progressing  Goal: Optimal Functional Ability  Outcome: Progressing  Goal: Absence of Infection Signs and Symptoms  Outcome: Progressing  Goal: Improved Oral Intake  Outcome: Progressing  Goal: Optimal Pain Control and Function  Outcome: Progressing  Goal: Skin Health and Integrity  Outcome: Progressing  Goal: Optimal Wound Healing  Outcome: Progressing     Problem: Acute Kidney Injury/Impairment  Goal: Fluid and Electrolyte Balance  Outcome: Progressing  Goal: Improved Oral Intake  Outcome: Progressing  Goal: Effective Renal Function  Outcome: Progressing     Problem: Electrolyte Imbalance  Goal: Electrolyte Balance  Outcome: Progressing     Problem: Nausea and Vomiting  Goal: Nausea and Vomiting Relief  Outcome: Progressing     Problem: Fluid Volume Deficit  Goal: Fluid Balance  Outcome: Progressing     Problem: Fall Injury Risk  Goal: Absence of Fall and Fall-Related Injury  Outcome: Progressing     Problem: Intestinal Obstruction  Goal: Optimal Bowel Function  Outcome: Progressing  Goal: Fluid and Electrolyte Balance  Outcome: Progressing  Goal: Absence of Infection Signs and Symptoms  Outcome: Progressing  Goal: Optimize Nutrition Status  Outcome: Progressing  Goal: Optimal Pain Control and Function  Outcome: Progressing

## 2025-06-02 NOTE — PLAN OF CARE
Problem: Wound  Goal: Skin Health and Integrity  Intervention: Optimize Skin Protection  Flowsheets (Taken 6/2/2025 7186)  Pressure Reduction Techniques:   frequent weight shift encouraged   heels elevated off bed  Activity Management:   Ambulated in [place] - L4   Walk with assistive devise and /or staff member - L3

## 2025-06-02 NOTE — ASSESSMENT & PLAN NOTE
EVGENY is likely due to pre-renal azotemia due to dehydration. Baseline creatinine is 0.08. Most recent creatinine and eGFR are listed below.  Recent Labs     05/31/25  1145 06/01/25  0439 06/02/25  0441   CREATININE 2.17* 1.76* 2.25*   EGFRNORACEVR 24 31 23      Plan  - EVGENY is being treated  - Avoid nephrotoxins and renally dose meds for GFR listed above  - Monitor urine output, serial BMP, and adjust therapy as needed  - .

## 2025-06-02 NOTE — PLAN OF CARE
06/02/25 1557   Discharge Assessment   Assessment Type Discharge Planning Assessment   Confirmed/corrected address, phone number and insurance Yes   Confirmed Demographics Correct on Facesheet   Source of Information patient   Communicated NAUN with patient/caregiver Date not available/Unable to determine   People in Home alone   Facility Arrived From: Home   Do you expect to return to your current living situation? Yes   Do you have help at home or someone to help you manage your care at home? No   Prior to hospitilization cognitive status: Alert/Oriented   Current cognitive status: Alert/Oriented   Walking or Climbing Stairs Difficulty no   Dressing/Bathing Difficulty no   Equipment Currently Used at Home none   Readmission within 30 days? No   Do you currently have service(s) that help you manage your care at home? No   Do you take prescription medications? Yes   Do you have prescription coverage? Yes   Do you have any problems affording any of your prescribed medications? No   Is the patient taking medications as prescribed? yes   Who is going to help you get home at discharge? Family   How do you get to doctors appointments? car, drives self   Are you on dialysis? No   Do you take coumadin? No   Discharge Plan A Home   Discharge Plan B Home Health   DME Needed Upon Discharge  none   Discharge Plan discussed with: Patient   Transition of Care Barriers None   Physical Activity   On average, how many days per week do you engage in moderate to strenuous exercise (like a brisk walk)? 5 days   On average, how many minutes do you engage in exercise at this level? 30 min   Financial Resource Strain   How hard is it for you to pay for the very basics like food, housing, medical care, and heating? Not very   Housing Stability   In the last 12 months, was there a time when you were not able to pay the mortgage or rent on time? N   At any time in the past 12 months, were you homeless or living in a shelter (including  now)? N   Transportation Needs   In the past 12 months, has lack of transportation kept you from medical appointments or from getting medications? no   In the past 12 months, has lack of transportation kept you from meetings, work, or from getting things needed for daily living? No   Food Insecurity   Within the past 12 months, you worried that your food would run out before you got the money to buy more. Never true   Within the past 12 months, the food you bought just didn't last and you didn't have money to get more. Never true   Stress   Do you feel stress - tense, restless, nervous, or anxious, or unable to sleep at night because your mind is troubled all the time - these days? To some exte   Social Isolation   How often do you feel lonely or isolated from those around you?  Never   Alcohol Use   Q1: How often do you have a drink containing alcohol? Monthly or l   Q2: How many drinks containing alcohol do you have on a typical day when you are drinking? 1 or 2   Q3: How often do you have six or more drinks on one occasion? Never   TransEngenities   In the past 12 months has the electric, gas, oil, or water company threatened to shut off services in your home? No   Health Literacy   How often do you need to have someone help you when you read instructions, pamphlets, or other written material from your doctor or pharmacy? Never     Will defer to Dr. Sunshine for further discharge plan.

## 2025-06-02 NOTE — SUBJECTIVE & OBJECTIVE
Interval History:     Review of Systems   Constitutional:  Positive for fatigue.   All other systems reviewed and are negative.    Objective:     Vital Signs (Most Recent):  Temp: 98.2 °F (36.8 °C) (06/02/25 0745)  Pulse: (!) 112 (06/02/25 1015)  Resp: 18 (06/02/25 1015)  BP: 90/67 (06/02/25 1015)  SpO2: 96 % (06/02/25 1015) Vital Signs (24h Range):  Temp:  [98 °F (36.7 °C)-99 °F (37.2 °C)] 98.2 °F (36.8 °C)  Pulse:  [] 112  Resp:  [18-19] 18  SpO2:  [91 %-96 %] 96 %  BP: ()/(67-87) 90/67     Weight: 56 kg (123 lb 7.3 oz)  Body mass index is 23.33 kg/m².    Intake/Output Summary (Last 24 hours) at 6/2/2025 1102  Last data filed at 6/2/2025 0829  Gross per 24 hour   Intake 1042.82 ml   Output 3550 ml   Net -2507.18 ml         Physical Exam  Constitutional:       Appearance: Normal appearance. She is normal weight.   HENT:      Head: Normocephalic and atraumatic.      Nose: Nose normal.      Mouth/Throat:      Mouth: Mucous membranes are dry.      Pharynx: Oropharynx is clear.   Eyes:      Extraocular Movements: Extraocular movements intact.      Conjunctiva/sclera: Conjunctivae normal.      Pupils: Pupils are equal, round, and reactive to light.   Cardiovascular:      Rate and Rhythm: Regular rhythm. Tachycardia present.      Pulses: Normal pulses.      Heart sounds: Normal heart sounds.   Pulmonary:      Effort: Pulmonary effort is normal.      Breath sounds: Normal breath sounds.   Abdominal:      General: Bowel sounds are absent.      Palpations: Abdomen is soft.      Comments: NGT   Musculoskeletal:         General: Normal range of motion.      Cervical back: Normal range of motion and neck supple.   Skin:     General: Skin is warm and dry.      Capillary Refill: Capillary refill takes 2 to 3 seconds.   Neurological:      General: No focal deficit present.      Mental Status: She is alert and oriented to person, place, and time. Mental status is at baseline.   Psychiatric:         Mood and Affect:  Mood normal.         Behavior: Behavior normal.               Significant Labs: All pertinent labs within the past 24 hours have been reviewed.  BMP:   Recent Labs   Lab 06/02/25  0441      *   K 3.3*   CL 78*   CO2 45*   BUN 80.0*   CREATININE 2.25*   CALCIUM 9.2   MG 3.10*     CBC:   Recent Labs   Lab 05/31/25  1145 06/01/25  0439 06/02/25 0441   WBC 8.86 8.81 12.02*   HGB 13.1 12.0 12.0   HCT 40.4 37.5 39.2   * 497* 485*     CMP:   Recent Labs   Lab 05/31/25  1145 06/01/25 0439 06/02/25 0441    141 147*   K 4.0 3.6 3.3*   CL 80* 84* 78*   CO2 40* 40* 45*   * 126* 101   BUN 59.0* 59.0* 80.0*   CREATININE 2.17* 1.76* 2.25*   CALCIUM 10.6* 9.4 9.2   PROT 8.3* 7.3 7.2   ALBUMIN 3.8 3.3* 3.2*   BILITOT 0.6 0.4 0.4   ALKPHOS 86 75 73   AST 24 22 23   ALT 14 12 16     Magnesium:   Recent Labs   Lab 06/01/25 0439 06/02/25  0441   MG 2.80* 3.10*       Significant Imaging: I have reviewed all pertinent imaging results/findings within the past 24 hours.

## 2025-06-02 NOTE — NURSING
Dr Jiang here to see patient. Talks with Dr Sunshine.  Patient to continue with low intermittent suction until tomorrow morning and try clamping at that time. Patient was updated on this and voices relief.

## 2025-06-02 NOTE — HPI
69-year-old female admitted on Saturday for complaint of abdominal bloating associated nausea and vomiting.  Patient stated was of sudden onset.  She had recently undergone back surgery on Thursday.  Since that time she has been taken narcotics for pain with sudden development of abdominal bloating with the associated nausea and vomiting.  She denies any recent sick contacts.  She denies any similar events in the past.  She has undergone abdominal exploration in November of last year for abdominal aortic repair.  Since that time she has had no GI related events.  Following admission she was placed under NG tube suctioning and CT imaging which exhibited changes concerning for small bowel obstruction.  Since the decompression she has had return of bowel function with passage of a large bowel movement yesterday.  She currently denies abdominal cramping and states she feels much better at this time.

## 2025-06-02 NOTE — NURSING
Orthostatic v/s were taken and recorded along with bed scale weight.  Patient denies weakness or dizziness with ambulating to bathroom.  States she is passing some gas this morning. Spoke with Dr Sunshine per phone to notify him of abnormal labs, particularly increased BUN/CR.  Also reported -0788 fluid balance.  V/S and weight also reported.  Order for follow up KUB and to increase NS to 150 ml/hr.  Will see patient this morning.

## 2025-06-02 NOTE — CONSULTS
[facility] Medical Surgical Unit  General Surgery  Consult Note    Patient Name: Julianna Vaughn  MRN: [id number]  Code Status: Full Code  Admission Date: 5/31/2025  Hospital Length of Stay: 0 days  Attending Physician: Victor Hugo Sunshine MD  Primary Care Provider: Be Carpenter MD    Patient information was obtained from patient and ER records.     Consults  Subjective:     Principal Problem: Intractable nausea and vomiting    History of Present Illness: 69-year-old female admitted on Saturday for complaint of abdominal bloating associated nausea and vomiting.  Patient stated was of sudden onset.  She had recently undergone back surgery on Thursday.  Since that time she has been taken narcotics for pain with sudden development of abdominal bloating with the associated nausea and vomiting.  She denies any recent sick contacts.  She denies any similar events in the past.  She has undergone abdominal exploration in November of last year for abdominal aortic repair.  Since that time she has had no GI related events.  Following admission she was placed under NG tube suctioning and CT imaging which exhibited changes concerning for small bowel obstruction.  Since the decompression she has had return of bowel function with passage of a large bowel movement yesterday.  She currently denies abdominal cramping and states she feels much better at this time.    No new subjective & objective note has been filed under this hospital service since the last note was generated.      Review of systems negative except stated as above    Physical exam:  Atraumatic normocephalic no scleral icterus noted  The patient is a and O x3  Abdomen: Soft abdomen nondistended nontender no organomegaly noted, no abdominal tenderness on deeper rebound exam clinically benign exam  Cardiovascular: Regular rate and rhythm    Assessment/Plan:   Partial small-bowel obstruction-continue with NG tube decompression with a GI testing of clear  liquids  Monitor for persistent bowel function  Plan to slowly advance diet as tolerated versus reimaging of CT scan with oral contrast    VTE Risk Mitigation (From admission, onward)           Ordered     enoxaparin injection 30 mg  Every 24 hours         06/02/25 0945     Place sequential compression device  Until discontinued         05/31/25 1343     IP VTE LOW RISK PATIENT  Once         05/31/25 1343                    Thank you for your consult. I will follow-up with patient. Please contact us if you have any additional questions.    Cary Jiang MD  General Surgery  [facility] - Medical Surgical Unit

## 2025-06-02 NOTE — ASSESSMENT & PLAN NOTE
Patient's blood pressure range in the last 24 hours was: BP  Min: 90/67  Max: 138/81.The patient's inpatient anti-hypertensive regimen is listed below:  Current Antihypertensives  hydrALAZINE injection 10 mg, Every 4 hours PRN, Intravenous    Plan  - BP is controlled, no changes needed to their regimen  - .

## 2025-06-02 NOTE — NURSING
Dr Sunshine here and view KUB scan.  NGT remains in place and SBO has not resolved. Dr Jiang (surgeon) was consulted and will see patient today.  Patient remains NPO with Right nare NGT connected to low intermittent suction. Patient denies any kind of pain.

## 2025-06-03 PROBLEM — E87.6 HYPOKALEMIA: Status: ACTIVE | Noted: 2025-06-03

## 2025-06-03 PROBLEM — K56.600 PARTIAL SMALL BOWEL OBSTRUCTION: Status: ACTIVE | Noted: 2025-06-03

## 2025-06-03 LAB
ALBUMIN SERPL-MCNC: 2.6 G/DL (ref 3.4–4.8)
ALBUMIN/GLOB SERPL: 0.8 RATIO (ref 1.1–2)
ALP SERPL-CCNC: 58 UNIT/L (ref 40–150)
ALT SERPL-CCNC: 11 UNIT/L (ref 0–55)
ANION GAP SERPL CALC-SCNC: 10 MEQ/L
ANION GAP SERPL CALC-SCNC: 18 MEQ/L
AST SERPL-CCNC: 20 UNIT/L (ref 11–45)
BASOPHILS # BLD AUTO: 0.04 X10(3)/MCL
BASOPHILS NFR BLD AUTO: 0.3 %
BILIRUB SERPL-MCNC: 0.3 MG/DL
BUN SERPL-MCNC: 63 MG/DL (ref 9.8–20.1)
BUN SERPL-MCNC: 80 MG/DL (ref 9.8–20.1)
CALCIUM SERPL-MCNC: 7.6 MG/DL (ref 8.4–10.2)
CALCIUM SERPL-MCNC: 7.8 MG/DL (ref 8.4–10.2)
CHLORIDE SERPL-SCNC: 87 MMOL/L (ref 98–107)
CHLORIDE SERPL-SCNC: 92 MMOL/L (ref 98–107)
CO2 SERPL-SCNC: 36 MMOL/L (ref 23–31)
CO2 SERPL-SCNC: 37 MMOL/L (ref 23–31)
CREAT SERPL-MCNC: 1.41 MG/DL (ref 0.55–1.02)
CREAT SERPL-MCNC: 1.72 MG/DL (ref 0.55–1.02)
CREAT/UREA NIT SERPL: 45
CREAT/UREA NIT SERPL: 47
EOSINOPHIL # BLD AUTO: 0.16 X10(3)/MCL (ref 0–0.9)
EOSINOPHIL NFR BLD AUTO: 1.3 %
ERYTHROCYTE [DISTWIDTH] IN BLOOD BY AUTOMATED COUNT: 13.5 % (ref 11.5–17)
GFR SERPLBLD CREATININE-BSD FMLA CKD-EPI: 32 ML/MIN/1.73/M2
GFR SERPLBLD CREATININE-BSD FMLA CKD-EPI: 41 ML/MIN/1.73/M2
GLOBULIN SER-MCNC: 3.1 GM/DL (ref 2.4–3.5)
GLUCOSE SERPL-MCNC: 187 MG/DL (ref 82–115)
GLUCOSE SERPL-MCNC: 77 MG/DL (ref 82–115)
HCT VFR BLD AUTO: 34 % (ref 37–47)
HGB BLD-MCNC: 10.4 G/DL (ref 12–16)
IMM GRANULOCYTES # BLD AUTO: 0.13 X10(3)/MCL (ref 0–0.04)
IMM GRANULOCYTES NFR BLD AUTO: 1 %
LYMPHOCYTES # BLD AUTO: 2.32 X10(3)/MCL (ref 0.6–4.6)
LYMPHOCYTES NFR BLD AUTO: 18.5 %
MAGNESIUM SERPL-MCNC: 2.8 MG/DL (ref 1.6–2.6)
MCH RBC QN AUTO: 29.6 PG (ref 27–31)
MCHC RBC AUTO-ENTMCNC: 30.6 G/DL (ref 33–36)
MCV RBC AUTO: 96.9 FL (ref 80–94)
MONOCYTES # BLD AUTO: 1.1 X10(3)/MCL (ref 0.1–1.3)
MONOCYTES NFR BLD AUTO: 8.8 %
NEUTROPHILS # BLD AUTO: 8.81 X10(3)/MCL (ref 2.1–9.2)
NEUTROPHILS NFR BLD AUTO: 70.1 %
NRBC BLD AUTO-RTO: 0 %
OHS QRS DURATION: 104 MS
OHS QTC CALCULATION: 490 MS
PLATELET # BLD AUTO: 397 X10(3)/MCL (ref 130–400)
PMV BLD AUTO: 11.2 FL (ref 7.4–10.4)
POTASSIUM SERPL-SCNC: 2.5 MMOL/L (ref 3.5–5.1)
POTASSIUM SERPL-SCNC: 3.3 MMOL/L (ref 3.5–5.1)
PROT SERPL-MCNC: 5.7 GM/DL (ref 5.8–7.6)
RBC # BLD AUTO: 3.51 X10(6)/MCL (ref 4.2–5.4)
SODIUM SERPL-SCNC: 138 MMOL/L (ref 136–145)
SODIUM SERPL-SCNC: 142 MMOL/L (ref 136–145)
WBC # BLD AUTO: 12.56 X10(3)/MCL (ref 4.5–11.5)

## 2025-06-03 PROCEDURE — 83735 ASSAY OF MAGNESIUM: CPT | Performed by: INTERNAL MEDICINE

## 2025-06-03 PROCEDURE — 94761 N-INVAS EAR/PLS OXIMETRY MLT: CPT

## 2025-06-03 PROCEDURE — 93005 ELECTROCARDIOGRAM TRACING: CPT

## 2025-06-03 PROCEDURE — 63600175 PHARM REV CODE 636 W HCPCS: Performed by: INTERNAL MEDICINE

## 2025-06-03 PROCEDURE — 25000003 PHARM REV CODE 250: Performed by: INTERNAL MEDICINE

## 2025-06-03 PROCEDURE — 11000001 HC ACUTE MED/SURG PRIVATE ROOM

## 2025-06-03 PROCEDURE — 25000003 PHARM REV CODE 250: Performed by: EMERGENCY MEDICINE

## 2025-06-03 PROCEDURE — 36415 COLL VENOUS BLD VENIPUNCTURE: CPT | Performed by: INTERNAL MEDICINE

## 2025-06-03 PROCEDURE — 85025 COMPLETE CBC W/AUTO DIFF WBC: CPT | Performed by: INTERNAL MEDICINE

## 2025-06-03 PROCEDURE — 80053 COMPREHEN METABOLIC PANEL: CPT | Performed by: INTERNAL MEDICINE

## 2025-06-03 RX ORDER — DOCUSATE SODIUM 100 MG/1
100 CAPSULE, LIQUID FILLED ORAL DAILY PRN
Status: DISCONTINUED | OUTPATIENT
Start: 2025-06-03 | End: 2025-06-03

## 2025-06-03 RX ORDER — IBUPROFEN 200 MG
1 TABLET ORAL DAILY PRN
Status: DISCONTINUED | OUTPATIENT
Start: 2025-06-03 | End: 2025-06-04 | Stop reason: HOSPADM

## 2025-06-03 RX ORDER — POTASSIUM CHLORIDE 7.45 MG/ML
20 INJECTION INTRAVENOUS ONCE
Status: DISCONTINUED | OUTPATIENT
Start: 2025-06-03 | End: 2025-06-03

## 2025-06-03 RX ORDER — ALUMINUM HYDROXIDE, MAGNESIUM HYDROXIDE, AND SIMETHICONE 1200; 120; 1200 MG/30ML; MG/30ML; MG/30ML
30 SUSPENSION ORAL EVERY 6 HOURS PRN
Status: DISCONTINUED | OUTPATIENT
Start: 2025-06-03 | End: 2025-06-04 | Stop reason: HOSPADM

## 2025-06-03 RX ORDER — TRAMADOL HYDROCHLORIDE 50 MG/1
50 TABLET, FILM COATED ORAL EVERY 8 HOURS PRN
Status: DISCONTINUED | OUTPATIENT
Start: 2025-06-03 | End: 2025-06-03

## 2025-06-03 RX ORDER — BENZONATATE 100 MG/1
100 CAPSULE ORAL 3 TIMES DAILY PRN
Status: DISCONTINUED | OUTPATIENT
Start: 2025-06-03 | End: 2025-06-04 | Stop reason: HOSPADM

## 2025-06-03 RX ORDER — ACETAMINOPHEN 325 MG/1
650 TABLET ORAL EVERY 8 HOURS PRN
Status: DISCONTINUED | OUTPATIENT
Start: 2025-06-03 | End: 2025-06-04 | Stop reason: HOSPADM

## 2025-06-03 RX ORDER — CETIRIZINE HYDROCHLORIDE 10 MG/1
10 TABLET ORAL DAILY PRN
Status: DISCONTINUED | OUTPATIENT
Start: 2025-06-03 | End: 2025-06-04 | Stop reason: HOSPADM

## 2025-06-03 RX ORDER — SIMETHICONE 80 MG
1 TABLET,CHEWABLE ORAL 3 TIMES DAILY PRN
Status: DISCONTINUED | OUTPATIENT
Start: 2025-06-03 | End: 2025-06-04 | Stop reason: HOSPADM

## 2025-06-03 RX ORDER — DEXTROSE MONOHYDRATE, SODIUM CHLORIDE, AND POTASSIUM CHLORIDE 50; 1.49; 4.5 G/1000ML; G/1000ML; G/1000ML
INJECTION, SOLUTION INTRAVENOUS CONTINUOUS
Status: DISCONTINUED | OUTPATIENT
Start: 2025-06-03 | End: 2025-06-04

## 2025-06-03 RX ORDER — POLYETHYLENE GLYCOL 3350 17 G/17G
17 POWDER, FOR SOLUTION ORAL DAILY PRN
Status: DISCONTINUED | OUTPATIENT
Start: 2025-06-03 | End: 2025-06-03

## 2025-06-03 RX ORDER — CLONIDINE HYDROCHLORIDE 0.1 MG/1
0.1 TABLET ORAL EVERY 8 HOURS PRN
Status: DISCONTINUED | OUTPATIENT
Start: 2025-06-03 | End: 2025-06-04 | Stop reason: HOSPADM

## 2025-06-03 RX ORDER — DILTIAZEM HYDROCHLORIDE 30 MG/1
60 TABLET, FILM COATED ORAL 2 TIMES DAILY
Status: DISCONTINUED | OUTPATIENT
Start: 2025-06-03 | End: 2025-06-04 | Stop reason: HOSPADM

## 2025-06-03 RX ORDER — METOPROLOL TARTRATE 1 MG/ML
5 INJECTION, SOLUTION INTRAVENOUS ONCE AS NEEDED
Status: DISCONTINUED | OUTPATIENT
Start: 2025-06-03 | End: 2025-06-04 | Stop reason: HOSPADM

## 2025-06-03 RX ORDER — POTASSIUM CHLORIDE 7.45 MG/ML
10 INJECTION INTRAVENOUS
Status: COMPLETED | OUTPATIENT
Start: 2025-06-03 | End: 2025-06-03

## 2025-06-03 RX ORDER — FLUTICASONE PROPIONATE 50 MCG
1 SPRAY, SUSPENSION (ML) NASAL DAILY PRN
Status: DISCONTINUED | OUTPATIENT
Start: 2025-06-03 | End: 2025-06-04 | Stop reason: HOSPADM

## 2025-06-03 RX ADMIN — DILTIAZEM HYDROCHLORIDE 60 MG: 30 TABLET, FILM COATED ORAL at 07:06

## 2025-06-03 RX ADMIN — POTASSIUM CHLORIDE, DEXTROSE MONOHYDRATE AND SODIUM CHLORIDE: 150; 5; 450 INJECTION, SOLUTION INTRAVENOUS at 05:06

## 2025-06-03 RX ADMIN — POTASSIUM CHLORIDE 10 MEQ: 7.46 INJECTION, SOLUTION INTRAVENOUS at 08:06

## 2025-06-03 RX ADMIN — POTASSIUM CHLORIDE 10 MEQ: 7.46 INJECTION, SOLUTION INTRAVENOUS at 06:06

## 2025-06-03 RX ADMIN — DILTIAZEM HYDROCHLORIDE 60 MG: 30 TABLET, FILM COATED ORAL at 08:06

## 2025-06-03 RX ADMIN — ENOXAPARIN SODIUM 30 MG: 30 INJECTION SUBCUTANEOUS at 05:06

## 2025-06-03 RX ADMIN — Medication 6 MG: at 08:06

## 2025-06-03 RX ADMIN — POTASSIUM CHLORIDE, DEXTROSE MONOHYDRATE AND SODIUM CHLORIDE: 150; 5; 450 INJECTION, SOLUTION INTRAVENOUS at 09:06

## 2025-06-03 RX ADMIN — CEFTRIAXONE SODIUM 1 G: 1 INJECTION, POWDER, FOR SOLUTION INTRAMUSCULAR; INTRAVENOUS at 10:06

## 2025-06-03 RX ADMIN — SODIUM CHLORIDE: 4.5 INJECTION, SOLUTION INTRAVENOUS at 04:06

## 2025-06-03 RX ADMIN — ROPINIROLE HYDROCHLORIDE 0.25 MG: 0.25 TABLET, FILM COATED ORAL at 08:06

## 2025-06-03 NOTE — ASSESSMENT & PLAN NOTE
Admit to observation  IV fluids  Follow labs  Review home meds  Antiemetics  OOB    6/3/25: NG tube clamped. Started on clear liquid diet this morning. Tolerating well. Restarted cardizem and had conversion to nsr and normal rate from a fib rvr.

## 2025-06-03 NOTE — NURSING
NG tube clamped at this time. ASIF Wiley advanced patient to clear liquid diet. Patient given water with no discomfort. Cardizem home med given. Instructed patient to call if there is any nausea. Patient stated understanding. HR currently 119.

## 2025-06-03 NOTE — PROGRESS NOTES
06/03/25 0712   Provider Notification   Reason for Communication Evaluate  (MD notified of patient with paroxysmal tachyarrythmia/sustained tachyarrythmia after being placed on telemetry. Discussed current VS & EKG results, recent electrolytes, and home meds.)   Provider Name Dr. Mcqueen   Provider Role Hospitalist   Method of Communication Call   Response See orders  (Restart home Cardizem 60mg PO BID and Metoprolol 5mg IVP x 1 PRN HR sustained > 120's)   Notification Time 0712

## 2025-06-03 NOTE — NURSING
Spoke with Dr Jiang via telephone. Orders received for D5 1/2 NS with 20 KCl. Relayed order request to Dr Mcqueen. Orders entered into Epic.    Instructions: This plan will send the code FBSE to the PM system.  DO NOT or CHANGE the price.

## 2025-06-03 NOTE — PROGRESS NOTES
[facility] - Medical Surgical Unit  [facility] Medicine  Progress Note    Patient Name: Julianna Vaughn  MRN: [id number]  Patient Class: IP- Inpatient   Admission Date: 5/31/2025  Length of Stay: 1 days  Attending Physician: Fatou Mcqueen DO  Primary Care Provider: Be Carpenter MD        Subjective     Principal Problem:Partial small bowel obstruction        HPI:  70 yo female presents to the ED c/o N/V since last night.  She recently underwent back surgery and has not eaten much since surgery.  Also states that last night her N/V started after taking her pain meds.  She likely did not eat prior to taking medicines.  No diarrhea.  No fever/chills.  No chest pain/SOB.  Labs were obtained which showed worsening of her renal indices.  BUN was 59 with a creatinine of 2.1.  Based on previous records her BUN and creatinine are usually normal.  She has not bee able to keep any food down.  She will be admitted for IV fluids and close monitoring.    Overview/Hospital Course:  6/1/25-Patient's abdomen became more distended prompting a KUB to be performed which showed a SBO.  CT scan ordered which confirmed SBO with transition zone.  NGT was placed with 2.5 liters immediately removed.  Will consult surgery.     6/2/25-Patient still seems to be doing good.  No pain to her abdomen at this time.  Her renal indices worsened some and her urine is showing a UTI.  Will increase IV fluids and add Rocephin IV.  Surgery will evaluate patient today.  Will continue to monitor.    6/3/25: I was contacted this morning by nursing about pt in a fib with rvr and having sob. She has a hx of a fib and is on cardizem at home which had not been resumed since admission. Cardizem PO was given and pt converted to NSR and HR normalized within 45 minutes. Upon my exam this morning, pt is sitting up in bed. She denies any sob, cp, abd pain, n/v. She had 2 normal bm's this morning. She has started a clear liquid diet.       Review of Systems    Constitutional:  Negative for activity change, appetite change and fever.   Respiratory:  Negative for chest tightness and shortness of breath.    Cardiovascular:  Negative for chest pain and leg swelling.   Gastrointestinal:  Negative for abdominal distention, abdominal pain, constipation, diarrhea, nausea and vomiting.   Musculoskeletal:  Negative for arthralgias.   Skin:  Negative for rash and wound.     Objective:     Vital Signs (Most Recent):  Temp: 98 °F (36.7 °C) (06/03/25 0745)  Pulse: (!) 129 (06/03/25 0634)  Resp: 18 (06/03/25 0800)  BP: (!) 143/78 (06/03/25 0745)  SpO2: 96 % (06/03/25 0745) Vital Signs (24h Range):  Temp:  [97.8 °F (36.6 °C)-98.8 °F (37.1 °C)] 98 °F (36.7 °C)  Pulse:  [] 129  Resp:  [18-20] 18  SpO2:  [93 %-96 %] 96 %  BP: (102-143)/(65-78) 143/78     Weight: 56 kg (123 lb 7.3 oz)  Body mass index is 23.33 kg/m².    Intake/Output Summary (Last 24 hours) at 6/3/2025 1405  Last data filed at 6/3/2025 0925  Gross per 24 hour   Intake 5140.33 ml   Output 300 ml   Net 4840.33 ml         Physical Exam  Vitals reviewed.   Constitutional:       General: She is not in acute distress.     Appearance: Normal appearance.   HENT:      Head: Normocephalic and atraumatic.      Nose: Nose normal.   Eyes:      Conjunctiva/sclera: Conjunctivae normal.   Cardiovascular:      Rate and Rhythm: Normal rate and regular rhythm.      Pulses: Normal pulses.      Heart sounds: Normal heart sounds. No murmur heard.     No gallop.   Pulmonary:      Effort: Pulmonary effort is normal.      Breath sounds: No wheezing, rhonchi or rales.   Abdominal:      General: There is no distension.      Palpations: Abdomen is soft.      Tenderness: There is no abdominal tenderness. There is no guarding.      Comments: Hypoactive bowel sounds   Musculoskeletal:         General: No swelling or deformity. Normal range of motion.      Cervical back: Normal range of motion and neck supple.      Right lower leg: No edema.       Left lower leg: No edema.   Skin:     General: Skin is warm and dry.      Findings: No rash.   Neurological:      General: No focal deficit present.      Mental Status: She is alert and oriented to person, place, and time. Mental status is at baseline.      Gait: Gait normal.   Psychiatric:         Mood and Affect: Mood normal.         Thought Content: Thought content normal.         Judgment: Judgment normal.               Significant Labs: All pertinent labs within the past 24 hours have been reviewed.  Recent Lab Results         06/03/25  0338        Albumin/Globulin Ratio 0.8       Albumin 2.6       ALP 58       ALT 11       Anion Gap 18.0       AST 20       Baso # 0.04       Basophil % 0.3       BILIRUBIN TOTAL 0.3       BUN 80.0       BUN/CREAT RATIO 47       Calcium 7.6       Chloride 87       CO2 37       Creatinine 1.72       eGFR 32  Comment: Estimated GFR calculated using the CKD-EPI creatinine (2021) equation.       Eos # 0.16       Eos % 1.3       Globulin, Total 3.1       Glucose 77       Hematocrit 34.0       Hemoglobin 10.4       Immature Grans (Abs) 0.13       Immature Granulocytes 1.0       Lymph # 2.32       LYMPH % 18.5       Magnesium  2.80       MCH 29.6       MCHC 30.6       MCV 96.9       Mono # 1.10       Mono % 8.8       MPV 11.2       Neut # 8.81       Neut % 70.1       nRBC 0.0       Platelet Count 397       Potassium 2.5  Comment: Critical Result called and verified by verbal readback to: Inez Briones on 06/03/2025 at 05:23. Reported by [id number].       PROTEIN TOTAL 5.7       RBC 3.51       RDW 13.5       Sodium 142       WBC 12.56               Significant Imaging: I have reviewed all pertinent imaging results/findings within the past 24 hours.      Assessment & Plan  Hypertension  Patient's blood pressure range in the last 24 hours was: BP  Min: 102/68  Max: 143/78.The patient's inpatient anti-hypertensive regimen is listed below:  Current Antihypertensives  hydrALAZINE injection 10 mg,  Every 4 hours PRN, Intravenous  diltiaZEM tablet 60 mg, 2 times daily, Oral  metoprolol injection 5 mg, Once as needed, Intravenous  cloNIDine tablet 0.1 mg, Every 8 hours PRN, Oral    Plan  - BP is controlled, no changes needed to their regimen  - .  EVGENY (acute kidney injury)  EVGENY is likely due to pre-renal azotemia due to dehydration. Baseline creatinine is 0.08. Most recent creatinine and eGFR are listed below.  Recent Labs     06/02/25  0441 06/02/25  1205 06/03/25  0338   CREATININE 2.25* 2.29* 1.72*   EGFRNORACEVR 23 23 32      Plan  - EVGENY is being treated  - Avoid nephrotoxins and renally dose meds for GFR listed above  - Monitor urine output, serial BMP, and adjust therapy as needed  - .  Acute cystitis without hematuria  IV rocephin  Culture pending    Cigarette smoker  Counseled for 8 minutes on smoking cessation. Explained how chantix works. Pt denies need for nicotine patch at this time. She will let me know if she wants a rx for chantix or patches upon dc. She wants to quit smoking.     Hypokalemia  Patient's most recent potassium results are listed below.   Recent Labs     06/02/25  0441 06/02/25  1205 06/03/25  0338   K 3.3* 3.3* 2.5*     Plan  - Replete potassium per protocol  - Monitor potassium Daily and prn  - Patient's hypokalemia is improving    Partial small bowel obstruction  Admit to observation  IV fluids  Follow labs  Review home meds  Antiemetics  OOB    6/3/25: NG tube clamped. Started on clear liquid diet this morning. Tolerating well. Restarted cardizem and had conversion to nsr and normal rate from a fib rvr.     VTE Risk Mitigation (From admission, onward)           Ordered     enoxaparin injection 30 mg  Every 24 hours         06/02/25 0945     Place sequential compression device  Until discontinued         05/31/25 1343     IP VTE LOW RISK PATIENT  Once         05/31/25 1343                    Discharge Planning   NAUN:      Code Status: Full Code   Medical Readiness for Discharge  Date:   Discharge Plan A: Home                        FELISA SETH DO  Department of Hospital Medicine   [facility] - [facility]

## 2025-06-03 NOTE — ASSESSMENT & PLAN NOTE
Patient's most recent potassium results are listed below.   Recent Labs     06/02/25  0441 06/02/25  1205 06/03/25  0338   K 3.3* 3.3* 2.5*     Plan  - Replete potassium per protocol  - Monitor potassium Daily and prn  - Patient's hypokalemia is improving

## 2025-06-03 NOTE — ASSESSMENT & PLAN NOTE
Counseled for 8 minutes on smoking cessation. Explained how chantix works. Pt denies need for nicotine patch at this time. She will let me know if she wants a rx for chantix or patches upon dc. She wants to quit smoking.

## 2025-06-03 NOTE — PLAN OF CARE
Problem: Intestinal Obstruction  Goal: Optimal Bowel Function  Outcome: Ongoing  Intervention: Promote Bowel Function  Flowsheets (Taken 6/3/2025 0449)  Body Position: position changed independently  Head of Bed (HOB) Positioning: HOB at 20-30 degrees  Goal: Fluid and Electrolyte Balance  Outcome: Ongoing  Intervention: Monitor and Manage Hypovolemia  Flowsheets (Taken 6/3/2025 0449)  Fluid/Electrolyte Management: fluids provided  Goal: Absence of Infection Signs and Symptoms  Outcome: Ongoing  Intervention: Prevent or Manage Infection  Flowsheets (Taken 6/3/2025 0449)  Fever Reduction/Comfort Measures:   lightweight bedding   lightweight clothing  Infection Management: aseptic technique maintained  Goal: Optimize Nutrition Status  Outcome: Ongoing  Goal: Optimal Pain Control and Function  Outcome: Ongoing  Intervention: Prevent or Manage Pain  Flowsheets (Taken 6/3/2025 0449)  Sleep/Rest Enhancement:   awakenings minimized   noise level reduced   regular sleep/rest pattern promoted   room darkened  Diversional Activities:   television   smartphone  Pain Management Interventions:   quiet environment facilitated   care clustered

## 2025-06-03 NOTE — ASSESSMENT & PLAN NOTE
EVGENY is likely due to pre-renal azotemia due to dehydration. Baseline creatinine is 0.08. Most recent creatinine and eGFR are listed below.  Recent Labs     06/02/25  0441 06/02/25  1205 06/03/25  0338   CREATININE 2.25* 2.29* 1.72*   EGFRNORACEVR 23 23 32      Plan  - EVGENY is being treated  - Avoid nephrotoxins and renally dose meds for GFR listed above  - Monitor urine output, serial BMP, and adjust therapy as needed  - .

## 2025-06-03 NOTE — SUBJECTIVE & OBJECTIVE
Review of Systems   Constitutional:  Negative for activity change, appetite change and fever.   Respiratory:  Negative for chest tightness and shortness of breath.    Cardiovascular:  Negative for chest pain and leg swelling.   Gastrointestinal:  Negative for abdominal distention, abdominal pain, constipation, diarrhea, nausea and vomiting.   Musculoskeletal:  Negative for arthralgias.   Skin:  Negative for rash and wound.     Objective:     Vital Signs (Most Recent):  Temp: 98 °F (36.7 °C) (06/03/25 0745)  Pulse: (!) 129 (06/03/25 0634)  Resp: 18 (06/03/25 0800)  BP: (!) 143/78 (06/03/25 0745)  SpO2: 96 % (06/03/25 0745) Vital Signs (24h Range):  Temp:  [97.8 °F (36.6 °C)-98.8 °F (37.1 °C)] 98 °F (36.7 °C)  Pulse:  [] 129  Resp:  [18-20] 18  SpO2:  [93 %-96 %] 96 %  BP: (102-143)/(65-78) 143/78     Weight: 56 kg (123 lb 7.3 oz)  Body mass index is 23.33 kg/m².    Intake/Output Summary (Last 24 hours) at 6/3/2025 1405  Last data filed at 6/3/2025 0925  Gross per 24 hour   Intake 5140.33 ml   Output 300 ml   Net 4840.33 ml         Physical Exam  Vitals reviewed.   Constitutional:       General: She is not in acute distress.     Appearance: Normal appearance.   HENT:      Head: Normocephalic and atraumatic.      Nose: Nose normal.   Eyes:      Conjunctiva/sclera: Conjunctivae normal.   Cardiovascular:      Rate and Rhythm: Normal rate and regular rhythm.      Pulses: Normal pulses.      Heart sounds: Normal heart sounds. No murmur heard.     No gallop.   Pulmonary:      Effort: Pulmonary effort is normal.      Breath sounds: No wheezing, rhonchi or rales.   Abdominal:      General: There is no distension.      Palpations: Abdomen is soft.      Tenderness: There is no abdominal tenderness. There is no guarding.      Comments: Hypoactive bowel sounds   Musculoskeletal:         General: No swelling or deformity. Normal range of motion.      Cervical back: Normal range of motion and neck supple.      Right lower  leg: No edema.      Left lower leg: No edema.   Skin:     General: Skin is warm and dry.      Findings: No rash.   Neurological:      General: No focal deficit present.      Mental Status: She is alert and oriented to person, place, and time. Mental status is at baseline.      Gait: Gait normal.   Psychiatric:         Mood and Affect: Mood normal.         Thought Content: Thought content normal.         Judgment: Judgment normal.               Significant Labs: All pertinent labs within the past 24 hours have been reviewed.  Recent Lab Results         06/03/25  0338        Albumin/Globulin Ratio 0.8       Albumin 2.6       ALP 58       ALT 11       Anion Gap 18.0       AST 20       Baso # 0.04       Basophil % 0.3       BILIRUBIN TOTAL 0.3       BUN 80.0       BUN/CREAT RATIO 47       Calcium 7.6       Chloride 87       CO2 37       Creatinine 1.72       eGFR 32  Comment: Estimated GFR calculated using the CKD-EPI creatinine (2021) equation.       Eos # 0.16       Eos % 1.3       Globulin, Total 3.1       Glucose 77       Hematocrit 34.0       Hemoglobin 10.4       Immature Grans (Abs) 0.13       Immature Granulocytes 1.0       Lymph # 2.32       LYMPH % 18.5       Magnesium  2.80       MCH 29.6       MCHC 30.6       MCV 96.9       Mono # 1.10       Mono % 8.8       MPV 11.2       Neut # 8.81       Neut % 70.1       nRBC 0.0       Platelet Count 397       Potassium 2.5  Comment: Critical Result called and verified by verbal readback to: Inez Briones on 06/03/2025 at 05:23. Reported by [id number].       PROTEIN TOTAL 5.7       RBC 3.51       RDW 13.5       Sodium 142       WBC 12.56               Significant Imaging: I have reviewed all pertinent imaging results/findings within the past 24 hours.

## 2025-06-03 NOTE — NURSING
Order received per Dr Jiang to D/C NG tube. DC'd with no complaints from patient and no drainage.

## 2025-06-03 NOTE — ASSESSMENT & PLAN NOTE
Patient's blood pressure range in the last 24 hours was: BP  Min: 102/68  Max: 143/78.The patient's inpatient anti-hypertensive regimen is listed below:  Current Antihypertensives  hydrALAZINE injection 10 mg, Every 4 hours PRN, Intravenous  diltiaZEM tablet 60 mg, 2 times daily, Oral  metoprolol injection 5 mg, Once as needed, Intravenous  cloNIDine tablet 0.1 mg, Every 8 hours PRN, Oral    Plan  - BP is controlled, no changes needed to their regimen  - .

## 2025-06-03 NOTE — PLAN OF CARE
Problem: Adult Inpatient Plan of Care  Goal: Plan of Care Review  Outcome: Ongoing  Goal: Patient-Specific Goal (Individualized)  Outcome: Ongoing  Goal: Absence of Hospital-Acquired Illness or Injury  Outcome: Ongoing  Goal: Optimal Comfort and Wellbeing  Outcome: Ongoing  Goal: Readiness for Transition of Care  Outcome: Ongoing     Problem: Wound  Goal: Optimal Coping  Outcome: Ongoing  Goal: Optimal Functional Ability  Outcome: Ongoing  Goal: Absence of Infection Signs and Symptoms  Outcome: Ongoing  Goal: Improved Oral Intake  Outcome: Ongoing  Goal: Optimal Pain Control and Function  Outcome: Ongoing  Goal: Skin Health and Integrity  Outcome: Ongoing  Goal: Optimal Wound Healing  Outcome: Ongoing     Problem: Acute Kidney Injury/Impairment  Goal: Fluid and Electrolyte Balance  Outcome: Ongoing  Goal: Improved Oral Intake  Outcome: Ongoing  Goal: Effective Renal Function  Outcome: Ongoing     Problem: Electrolyte Imbalance  Goal: Electrolyte Balance  Outcome: Ongoing     Problem: Nausea and Vomiting  Goal: Nausea and Vomiting Relief  Outcome: Ongoing     Problem: Fluid Volume Deficit  Goal: Fluid Balance  Outcome: Ongoing     Problem: Fall Injury Risk  Goal: Absence of Fall and Fall-Related Injury  Outcome: Ongoing     Problem: Intestinal Obstruction  Goal: Optimal Bowel Function  Outcome: Ongoing  Goal: Fluid and Electrolyte Balance  Outcome: Ongoing  Goal: Absence of Infection Signs and Symptoms  Outcome: Ongoing  Goal: Optimize Nutrition Status  Outcome: Ongoing  Goal: Optimal Pain Control and Function  Outcome: Ongoing     Problem: Skin Injury Risk Increased  Goal: Skin Health and Integrity  Outcome: Ongoing

## 2025-06-04 VITALS
TEMPERATURE: 98 F | WEIGHT: 123.44 LBS | SYSTOLIC BLOOD PRESSURE: 122 MMHG | RESPIRATION RATE: 18 BRPM | DIASTOLIC BLOOD PRESSURE: 74 MMHG | HEART RATE: 83 BPM | OXYGEN SATURATION: 96 % | BODY MASS INDEX: 23.3 KG/M2 | HEIGHT: 61 IN

## 2025-06-04 PROBLEM — K56.600 PARTIAL SMALL BOWEL OBSTRUCTION: Status: RESOLVED | Noted: 2025-06-03 | Resolved: 2025-06-04

## 2025-06-04 PROBLEM — N17.9 AKI (ACUTE KIDNEY INJURY): Status: RESOLVED | Noted: 2025-05-31 | Resolved: 2025-06-04

## 2025-06-04 LAB
ANION GAP SERPL CALC-SCNC: 8 MEQ/L
BACTERIA UR CULT: ABNORMAL
BASOPHILS # BLD AUTO: 0.03 X10(3)/MCL
BASOPHILS NFR BLD AUTO: 0.3 %
BUN SERPL-MCNC: 37 MG/DL (ref 9.8–20.1)
CALCIUM SERPL-MCNC: 7.5 MG/DL (ref 8.4–10.2)
CHLORIDE SERPL-SCNC: 100 MMOL/L (ref 98–107)
CO2 SERPL-SCNC: 29 MMOL/L (ref 23–31)
CREAT SERPL-MCNC: 1 MG/DL (ref 0.55–1.02)
CREAT/UREA NIT SERPL: 37
EOSINOPHIL # BLD AUTO: 0.12 X10(3)/MCL (ref 0–0.9)
EOSINOPHIL NFR BLD AUTO: 1 %
ERYTHROCYTE [DISTWIDTH] IN BLOOD BY AUTOMATED COUNT: 13.2 % (ref 11.5–17)
GFR SERPLBLD CREATININE-BSD FMLA CKD-EPI: >60 ML/MIN/1.73/M2
GLUCOSE SERPL-MCNC: 154 MG/DL (ref 82–115)
HCT VFR BLD AUTO: 31.2 % (ref 37–47)
HGB BLD-MCNC: 9.7 G/DL (ref 12–16)
IMM GRANULOCYTES # BLD AUTO: 0.25 X10(3)/MCL (ref 0–0.04)
IMM GRANULOCYTES NFR BLD AUTO: 2.1 %
LYMPHOCYTES # BLD AUTO: 2.16 X10(3)/MCL (ref 0.6–4.6)
LYMPHOCYTES NFR BLD AUTO: 18.5 %
MCH RBC QN AUTO: 30 PG (ref 27–31)
MCHC RBC AUTO-ENTMCNC: 31.1 G/DL (ref 33–36)
MCV RBC AUTO: 96.6 FL (ref 80–94)
MONOCYTES # BLD AUTO: 0.8 X10(3)/MCL (ref 0.1–1.3)
MONOCYTES NFR BLD AUTO: 6.8 %
NEUTROPHILS # BLD AUTO: 8.32 X10(3)/MCL (ref 2.1–9.2)
NEUTROPHILS NFR BLD AUTO: 71.3 %
NRBC BLD AUTO-RTO: 0 %
PLATELET # BLD AUTO: 354 X10(3)/MCL (ref 130–400)
PMV BLD AUTO: 10.9 FL (ref 7.4–10.4)
POTASSIUM SERPL-SCNC: 3.3 MMOL/L (ref 3.5–5.1)
RBC # BLD AUTO: 3.23 X10(6)/MCL (ref 4.2–5.4)
SODIUM SERPL-SCNC: 137 MMOL/L (ref 136–145)
WBC # BLD AUTO: 11.68 X10(3)/MCL (ref 4.5–11.5)

## 2025-06-04 PROCEDURE — 80048 BASIC METABOLIC PNL TOTAL CA: CPT | Performed by: INTERNAL MEDICINE

## 2025-06-04 PROCEDURE — 25000003 PHARM REV CODE 250: Performed by: INTERNAL MEDICINE

## 2025-06-04 PROCEDURE — 63600175 PHARM REV CODE 636 W HCPCS: Performed by: INTERNAL MEDICINE

## 2025-06-04 PROCEDURE — 85025 COMPLETE CBC W/AUTO DIFF WBC: CPT | Performed by: INTERNAL MEDICINE

## 2025-06-04 PROCEDURE — 36415 COLL VENOUS BLD VENIPUNCTURE: CPT | Performed by: INTERNAL MEDICINE

## 2025-06-04 PROCEDURE — 94761 N-INVAS EAR/PLS OXIMETRY MLT: CPT

## 2025-06-04 RX ORDER — NITROFURANTOIN 25; 75 MG/1; MG/1
100 CAPSULE ORAL 2 TIMES DAILY
Qty: 4 CAPSULE | Refills: 0 | Status: SHIPPED | OUTPATIENT
Start: 2025-06-04 | End: 2025-06-06

## 2025-06-04 RX ORDER — DILTIAZEM HYDROCHLORIDE 60 MG/1
60 TABLET, FILM COATED ORAL 2 TIMES DAILY
Qty: 60 TABLET | Refills: 0 | Status: SHIPPED | OUTPATIENT
Start: 2025-06-04

## 2025-06-04 RX ORDER — LOSARTAN POTASSIUM 25 MG/1
25 TABLET ORAL DAILY
Qty: 30 TABLET | Refills: 0 | Status: SHIPPED | OUTPATIENT
Start: 2025-06-04 | End: 2025-08-03

## 2025-06-04 RX ORDER — POTASSIUM CHLORIDE 750 MG/1
40 TABLET, EXTENDED RELEASE ORAL ONCE
Status: COMPLETED | OUTPATIENT
Start: 2025-06-04 | End: 2025-06-04

## 2025-06-04 RX ADMIN — CEFTRIAXONE SODIUM 1 G: 1 INJECTION, POWDER, FOR SOLUTION INTRAMUSCULAR; INTRAVENOUS at 10:06

## 2025-06-04 RX ADMIN — DILTIAZEM HYDROCHLORIDE 60 MG: 30 TABLET, FILM COATED ORAL at 08:06

## 2025-06-04 RX ADMIN — POTASSIUM CHLORIDE, DEXTROSE MONOHYDRATE AND SODIUM CHLORIDE: 150; 5; 450 INJECTION, SOLUTION INTRAVENOUS at 01:06

## 2025-06-04 RX ADMIN — POTASSIUM CHLORIDE 40 MEQ: 750 TABLET, EXTENDED RELEASE ORAL at 10:06

## 2025-06-04 NOTE — ASSESSMENT & PLAN NOTE
Counseled for 8 minutes on smoking cessation. Explained how chantix works. Pt denies need for nicotine patch at this time. She will let me know if she wants a rx for chantix or patches upon dc. She wants to quit smoking.     6/4/25: Pt counseled for 5 minutes on smoking cessation again. Pt does not want a rx for chantix or patches. She states that she will quit on her own.

## 2025-06-04 NOTE — DISCHARGE SUMMARY
[facility]  [facility] Medicine  Discharge Summary      Patient Name: Julianna Vaughn  MRN: [id number]  KASANDRA: [id number]  Patient Class: IP- Inpatient  Admission Date: 5/31/2025  Hospital Length of Stay: 2 days  Discharge Date and Time: 06/04/2025 11:46 AM  Attending Physician: No att. providers found   Discharging Provider: FELISA SETH DO  Primary Care Provider: Be Carpenter MD    Primary Care Team: Networked reference to record PCT     HPI:   70 yo female presents to the ED c/o N/V since last night.  She recently underwent back surgery and has not eaten much since surgery.  Also states that last night her N/V started after taking her pain meds.  She likely did not eat prior to taking medicines.  No diarrhea.  No fever/chills.  No chest pain/SOB.  Labs were obtained which showed worsening of her renal indices.  BUN was 59 with a creatinine of 2.1.  Based on previous records her BUN and creatinine are usually normal.  She has not bee able to keep any food down.  She will be admitted for IV fluids and close monitoring.    * No surgery found *      Hospital Course:   6/1/25-Patient's abdomen became more distended prompting a KUB to be performed which showed a SBO.  CT scan ordered which confirmed SBO with transition zone.  NGT was placed with 2.5 liters immediately removed.  Will consult surgery.     6/2/25-Patient still seems to be doing good.  No pain to her abdomen at this time.  Her renal indices worsened some and her urine is showing a UTI.  Will increase IV fluids and add Rocephin IV.  Surgery will evaluate patient today.  Will continue to monitor.    6/3/25: I was contacted this morning by nursing about pt in a fib with rvr and having sob. She has a hx of a fib and is on cardizem at home which had not been resumed since admission. Cardizem PO was given and pt converted to NSR and HR normalized within 45 minutes. Upon my exam this morning, pt is sitting up in bed. She  denies any sob, cp, abd pain, n/v. She had 2 normal bm's this morning. She has started a clear liquid diet.     6/4/25: Pt tolerated a low residue diet this morning. She denies any sob, cp, abd pain. Had 2 loose stools this morning. Surgery stated that pt was okay for discharge this morning. Pt is requesting a refill on losartan and diltiazem. Also sending 2 days of macrobid to complete UTI treatment. All questions answered. Pt does not want a rx for chantix or patches. She states that she will quit on her own.     PE:   General: alert and oriented.  HEENT: NC, AT.  Resp: CTAB.  Cardio: RRR, no m/r/g. No edema.  Abd: soft, NT, ND, + BS x 4       Goals of Care Treatment Preferences:  Code Status: Full Code      SDOH Screening:  The patient was screened for utility difficulties, food insecurity, transport difficulties, housing insecurity, and interpersonal safety and there were no concerns identified this admission.     Consults:   Consults (From admission, onward)          Status Ordering Provider     Inpatient consult to General Surgery  Once        Provider:  Cary Jiang MD    Acknowledged MARK ESTEBAN            Assessment & Plan  Hypertension  Patient's blood pressure range in the last 24 hours was: BP  Min: 122/74  Max: 129/69.The patient's inpatient anti-hypertensive regimen is listed below:  Current Antihypertensives  hydrALAZINE injection 10 mg, Every 4 hours PRN, Intravenous  diltiaZEM tablet 60 mg, 2 times daily, Oral  metoprolol injection 5 mg, Once as needed, Intravenous  cloNIDine tablet 0.1 mg, Every 8 hours PRN, Oral  losartan (COZAAR) tablet, Daily, Oral  diltiaZEM (CARDIZEM) tablet, 2 times daily, Oral    Plan  - BP is controlled, no changes needed to their regimen  - .  Acute cystitis without hematuria  IV rocephin  Culture pending  6/4/25: Culture grew e coli sensitive to rocephin and macrobid. she received 3 doses of rocephin. Will send rx for 2 days of macrobid.  Cigarette  smoker  Counseled for 8 minutes on smoking cessation. Explained how chantix works. Pt denies need for nicotine patch at this time. She will let me know if she wants a rx for chantix or patches upon dc. She wants to quit smoking.     6/4/25: Pt counseled for 5 minutes on smoking cessation again. Pt does not want a rx for chantix or patches. She states that she will quit on her own.   Hypokalemia  Patient's most recent potassium results are listed below.   Recent Labs     06/03/25  0338 06/03/25  1423 06/04/25  0344   K 2.5* 3.3* 3.3*     Plan  - Replete potassium per protocol  - Monitor potassium Daily and prn  - Patient's hypokalemia is improving    6/4/25: gave 40meq po this morning. Rec repeat labs with pcp in the next 1-2 weeks.    Final Active Diagnoses:    Diagnosis Date Noted POA    Hypokalemia [E87.6] 06/03/2025 No    Acute cystitis without hematuria [N30.00] 06/02/2025 Yes    Cigarette smoker [F17.210] 01/16/2025 Yes    Hypertension [I10] 05/26/2023 Yes      Problems Resolved During this Admission:    Diagnosis Date Noted Date Resolved POA    PRINCIPAL PROBLEM:  Partial small bowel obstruction [K56.600] 06/03/2025 06/04/2025 Yes    EVGENY (acute kidney injury) [N17.9] 05/31/2025 06/04/2025 Yes       Discharged Condition: good    Disposition: Home or Self Care    Follow Up:   Follow-up Information       Be Carpenter MD Follow up on 6/10/2025.    Specialty: Family Medicine  Why: @9:00am follow up appt  Contact information:  [address] [place] [address]  [phone]                           Patient Instructions:   No discharge procedures on file.    Significant Diagnostic Studies: N/A    Pending Diagnostic Studies:       None           Medications:  Reconciled Home Medications:      Medication List        PAUSE taking these medications      doxycycline 100 MG capsule  Wait to take this until your doctor or other care provider tells you to start again.  Commonly known as: MONODOX  Take 100 mg by  mouth every 12 (twelve) hours.            START taking these medications      nitrofurantoin (macrocrystal-monohydrate) 100 MG capsule  Commonly known as: MACROBID  Take 1 capsule (100 mg total) by mouth 2 (two) times daily. for 2 days            CONTINUE taking these medications      ALPRAZolam 1 MG tablet  Commonly known as: XANAX  Take 1 tablet (1 mg total) by mouth 3 (three) times daily.     diltiaZEM 60 MG tablet  Commonly known as: CARDIZEM  Take 1 tablet (60 mg total) by mouth 2 (two) times daily.     losartan 25 MG tablet  Commonly known as: COZAAR  Take 1 tablet (25 mg total) by mouth once daily.     methocarbamoL 500 MG Tab  Commonly known as: Robaxin  Take 500 mg by mouth 3 (three) times daily as needed (For spasams).     oxyCODONE-acetaminophen  mg per tablet  Commonly known as: PERCOCET  Take 1 tablet by mouth every 6 (six) hours as needed for Pain.     rOPINIRole 0.25 MG tablet  Commonly known as: REQUIP  Take 1 tablet (0.25 mg total) by mouth every evening.              Indwelling Lines/Drains at time of discharge:   Lines/Drains/Airways       None                   Time spent on the discharge of patient: 40 minutes         FELISA SETH DO  Department of Hospital Medicine  [facility] - [facility] Surgical Unit

## 2025-06-04 NOTE — ASSESSMENT & PLAN NOTE
Patient's most recent potassium results are listed below.   Recent Labs     06/03/25  0338 06/03/25  1423 06/04/25  0344   K 2.5* 3.3* 3.3*     Plan  - Replete potassium per protocol  - Monitor potassium Daily and prn  - Patient's hypokalemia is improving    6/4/25: gave 40meq po this morning. Rec repeat labs with pcp in the next 1-2 weeks.

## 2025-06-04 NOTE — ASSESSMENT & PLAN NOTE
Patient's blood pressure range in the last 24 hours was: BP  Min: 122/74  Max: 129/69.The patient's inpatient anti-hypertensive regimen is listed below:  Current Antihypertensives  hydrALAZINE injection 10 mg, Every 4 hours PRN, Intravenous  diltiaZEM tablet 60 mg, 2 times daily, Oral  metoprolol injection 5 mg, Once as needed, Intravenous  cloNIDine tablet 0.1 mg, Every 8 hours PRN, Oral  losartan (COZAAR) tablet, Daily, Oral  diltiaZEM (CARDIZEM) tablet, 2 times daily, Oral    Plan  - BP is controlled, no changes needed to their regimen  - .

## 2025-06-04 NOTE — PLAN OF CARE
Problem: Adult Inpatient Plan of Care  Goal: Plan of Care Review  Outcome: Met  Goal: Patient-Specific Goal (Individualized)  Outcome: Met  Goal: Absence of Hospital-Acquired Illness or Injury  Outcome: Met  Goal: Optimal Comfort and Wellbeing  Outcome: Met  Goal: Readiness for Transition of Care  Outcome: Met     Problem: Wound  Goal: Optimal Coping  Outcome: Met  Goal: Optimal Functional Ability  Outcome: Met  Goal: Absence of Infection Signs and Symptoms  Outcome: Met  Goal: Improved Oral Intake  Outcome: Met  Goal: Optimal Pain Control and Function  Outcome: Met  Goal: Skin Health and Integrity  Outcome: Met  Goal: Optimal Wound Healing  Outcome: Met     Problem: Acute Kidney Injury/Impairment  Goal: Fluid and Electrolyte Balance  Outcome: Met  Goal: Improved Oral Intake  Outcome: Met  Goal: Effective Renal Function  Outcome: Met     Problem: Electrolyte Imbalance  Goal: Electrolyte Balance  Outcome: Met     Problem: Nausea and Vomiting  Goal: Nausea and Vomiting Relief  Outcome: Met     Problem: Fluid Volume Deficit  Goal: Fluid Balance  Outcome: Met     Problem: Fall Injury Risk  Goal: Absence of Fall and Fall-Related Injury  Outcome: Met     Problem: Intestinal Obstruction  Goal: Optimal Bowel Function  Outcome: Met  Goal: Fluid and Electrolyte Balance  Outcome: Met  Goal: Absence of Infection Signs and Symptoms  Outcome: Met  Goal: Optimize Nutrition Status  Outcome: Met  Goal: Optimal Pain Control and Function  Outcome: Met     Problem: Skin Injury Risk Increased  Goal: Skin Health and Integrity  Outcome: Met     Problem: Comorbidity Management  Goal: Maintenance of Behavioral Health Symptom Control  Outcome: Met  Goal: Blood Pressure in Desired Range  Outcome: Met     Problem: Dysrhythmia  Goal: Normalized Cardiac Rhythm  Outcome: Met     Problem: UTI (Urinary Tract Infection)  Goal: Improved Infection Symptoms  Outcome: Met

## 2025-06-04 NOTE — PLAN OF CARE
06/04/25 0938   Final Note   Assessment Type Final Discharge Note   Anticipated Discharge Disposition Home   What phone number can be called within the next 1-3 days to see how you are doing after discharge? [phone]   Hospital Resources/Appts/Education Provided Provided education on problems/symptoms using teachback   Post-Acute Status   Discharge Delays None known at this time

## 2025-06-04 NOTE — ASSESSMENT & PLAN NOTE
IV rocephin  Culture pending  6/4/25: Culture grew e coli sensitive to rocephin and macrobid. she received 3 doses of rocephin. Will send rx for 2 days of macrobid.

## 2025-06-04 NOTE — PLAN OF CARE
Problem: Acute Kidney Injury/Impairment  Goal: Fluid and Electrolyte Balance  Outcome: Progressing  Intervention: Monitor and Manage Fluid and Electrolyte Balance  Flowsheets (Taken 6/4/2025 0456)  Fluid/Electrolyte Management:   fluids provided   electrolyte supplement initiated  Goal: Improved Oral Intake  Outcome: Progressing  Intervention: Promote and Optimize Oral Intake  Flowsheets (Taken 6/4/2025 0456)  Oral Nutrition Promotion:   rest periods promoted   physical activity promoted  Nutrition Interventions: diet advanced  Goal: Effective Renal Function  Outcome: Progressing     Problem: Electrolyte Imbalance  Goal: Electrolyte Balance  Outcome: Progressing  Intervention: Monitor and Manage Electrolyte Imbalance  Flowsheets (Taken 6/4/2025 0456)  Fluid/Electrolyte Management:   fluids provided   electrolyte supplement initiated     Problem: Intestinal Obstruction  Goal: Optimal Bowel Function  Outcome: Progressing  Goal: Fluid and Electrolyte Balance  Outcome: Progressing  Intervention: Monitor and Manage Hypovolemia  Flowsheets (Taken 6/4/2025 0456)  Fluid/Electrolyte Management:   fluids provided   electrolyte supplement initiated  Goal: Absence of Infection Signs and Symptoms  Outcome: Progressing  Intervention: Prevent or Manage Infection  Flowsheets (Taken 6/4/2025 0456)  Fever Reduction/Comfort Measures: fluid intake increased  Infection Management: aseptic technique maintained  Goal: Optimize Nutrition Status  Outcome: Progressing  Goal: Optimal Pain Control and Function  Outcome: Progressing  Intervention: Prevent or Manage Pain  Flowsheets (Taken 6/4/2025 0456)  Sleep/Rest Enhancement:   awakenings minimized   noise level reduced   room darkened  Diversional Activities:   smartphone   television  Pain Management Interventions:   quiet environment facilitated   care clustered

## 2025-06-06 ENCOUNTER — PATIENT OUTREACH (OUTPATIENT)
Dept: ADMINISTRATIVE | Facility: CLINIC | Age: 70
End: 2025-06-06
Payer: COMMERCIAL

## 2025-08-27 ENCOUNTER — OFFICE VISIT (OUTPATIENT)
Dept: FAMILY MEDICINE | Facility: CLINIC | Age: 70
End: 2025-08-27
Payer: COMMERCIAL

## 2025-08-27 VITALS
OXYGEN SATURATION: 97 % | HEART RATE: 49 BPM | TEMPERATURE: 99 F | DIASTOLIC BLOOD PRESSURE: 64 MMHG | HEIGHT: 61 IN | RESPIRATION RATE: 18 BRPM | WEIGHT: 116 LBS | SYSTOLIC BLOOD PRESSURE: 112 MMHG | BODY MASS INDEX: 21.9 KG/M2

## 2025-08-27 DIAGNOSIS — I10 HYPERTENSION, UNSPECIFIED TYPE: ICD-10-CM

## 2025-08-27 DIAGNOSIS — S80.811A ABRASION OF RIGHT LOWER EXTREMITY, INITIAL ENCOUNTER: ICD-10-CM

## 2025-08-27 DIAGNOSIS — I25.10 CORONARY ARTERY DISEASE INVOLVING NATIVE CORONARY ARTERY OF NATIVE HEART WITHOUT ANGINA PECTORIS: ICD-10-CM

## 2025-08-27 DIAGNOSIS — R53.1 WEAKNESS: ICD-10-CM

## 2025-08-27 DIAGNOSIS — R60.9 EDEMA, UNSPECIFIED TYPE: ICD-10-CM

## 2025-08-27 DIAGNOSIS — G25.81 RESTLESS LEG SYNDROME: ICD-10-CM

## 2025-08-27 DIAGNOSIS — F17.210 CIGARETTE SMOKER: ICD-10-CM

## 2025-08-27 DIAGNOSIS — I10 PRIMARY HYPERTENSION: Primary | ICD-10-CM

## 2025-08-27 DIAGNOSIS — F41.9 ANXIETY: ICD-10-CM

## 2025-08-27 PROCEDURE — 99214 OFFICE O/P EST MOD 30 MIN: CPT | Mod: S$GLB,,, | Performed by: FAMILY MEDICINE

## 2025-08-27 PROCEDURE — 3008F BODY MASS INDEX DOCD: CPT | Mod: CPTII,S$GLB,, | Performed by: FAMILY MEDICINE

## 2025-08-27 PROCEDURE — 1159F MED LIST DOCD IN RCRD: CPT | Mod: CPTII,S$GLB,, | Performed by: FAMILY MEDICINE

## 2025-08-27 PROCEDURE — 4010F ACE/ARB THERAPY RXD/TAKEN: CPT | Mod: CPTII,S$GLB,, | Performed by: FAMILY MEDICINE

## 2025-08-27 PROCEDURE — 3044F HG A1C LEVEL LT 7.0%: CPT | Mod: CPTII,S$GLB,, | Performed by: FAMILY MEDICINE

## 2025-08-27 PROCEDURE — 1125F AMNT PAIN NOTED PAIN PRSNT: CPT | Mod: CPTII,S$GLB,, | Performed by: FAMILY MEDICINE

## 2025-08-27 PROCEDURE — 3074F SYST BP LT 130 MM HG: CPT | Mod: CPTII,S$GLB,, | Performed by: FAMILY MEDICINE

## 2025-08-27 PROCEDURE — 3078F DIAST BP <80 MM HG: CPT | Mod: CPTII,S$GLB,, | Performed by: FAMILY MEDICINE

## 2025-08-27 RX ORDER — BUMETANIDE 1 MG/1
1 TABLET ORAL EVERY OTHER DAY
Qty: 45 TABLET | Refills: 1 | Status: SHIPPED | OUTPATIENT
Start: 2025-08-27

## 2025-08-27 RX ORDER — LOSARTAN POTASSIUM 25 MG/1
25 TABLET ORAL DAILY
Qty: 90 TABLET | Refills: 1 | Status: SHIPPED | OUTPATIENT
Start: 2025-08-27 | End: 2025-08-27 | Stop reason: SDUPTHER

## 2025-08-27 RX ORDER — CLOPIDOGREL BISULFATE 75 MG/1
75 TABLET ORAL DAILY
Qty: 90 TABLET | Refills: 1 | Status: SHIPPED | OUTPATIENT
Start: 2025-08-27 | End: 2025-08-27 | Stop reason: SDUPTHER

## 2025-08-27 RX ORDER — ROPINIROLE 0.25 MG/1
0.25 TABLET, FILM COATED ORAL NIGHTLY
Qty: 90 TABLET | Refills: 1 | Status: SHIPPED | OUTPATIENT
Start: 2025-08-27 | End: 2025-08-27 | Stop reason: SDUPTHER

## 2025-08-27 RX ORDER — ROPINIROLE 0.25 MG/1
0.25 TABLET, FILM COATED ORAL NIGHTLY
Qty: 90 TABLET | Refills: 1 | Status: SHIPPED | OUTPATIENT
Start: 2025-08-27 | End: 2026-02-23

## 2025-08-27 RX ORDER — LOSARTAN POTASSIUM 25 MG/1
25 TABLET ORAL DAILY
Qty: 90 TABLET | Refills: 1 | Status: SHIPPED | OUTPATIENT
Start: 2025-08-27 | End: 2025-10-26

## 2025-08-27 RX ORDER — ESCITALOPRAM OXALATE 10 MG/1
10 TABLET ORAL DAILY
Qty: 90 TABLET | Refills: 1 | Status: SHIPPED | OUTPATIENT
Start: 2025-08-27

## 2025-08-27 RX ORDER — ATORVASTATIN CALCIUM 40 MG/1
40 TABLET, FILM COATED ORAL DAILY
COMMUNITY
End: 2025-08-27 | Stop reason: SDUPTHER

## 2025-08-27 RX ORDER — ESCITALOPRAM OXALATE 10 MG/1
10 TABLET ORAL DAILY
Qty: 90 TABLET | Refills: 1 | Status: SHIPPED | OUTPATIENT
Start: 2025-08-27 | End: 2025-08-27 | Stop reason: SDUPTHER

## 2025-08-27 RX ORDER — ATORVASTATIN CALCIUM 40 MG/1
40 TABLET, FILM COATED ORAL NIGHTLY
Qty: 90 TABLET | Refills: 1 | Status: SHIPPED | OUTPATIENT
Start: 2025-08-27

## 2025-08-27 RX ORDER — METOPROLOL TARTRATE 25 MG/1
12.5 TABLET, FILM COATED ORAL 2 TIMES DAILY
Qty: 90 TABLET | Refills: 1 | Status: SHIPPED | OUTPATIENT
Start: 2025-08-27

## 2025-08-27 RX ORDER — CLOPIDOGREL BISULFATE 75 MG/1
75 TABLET ORAL DAILY
COMMUNITY
End: 2025-08-27 | Stop reason: SDUPTHER

## 2025-08-27 RX ORDER — ESCITALOPRAM OXALATE 10 MG/1
10 TABLET ORAL DAILY
COMMUNITY
End: 2025-08-27 | Stop reason: SDUPTHER

## 2025-08-27 RX ORDER — BUMETANIDE 1 MG/1
1 TABLET ORAL EVERY OTHER DAY
Qty: 45 TABLET | Refills: 1 | Status: SHIPPED | OUTPATIENT
Start: 2025-08-27 | End: 2025-08-27 | Stop reason: SDUPTHER

## 2025-08-27 RX ORDER — ATORVASTATIN CALCIUM 40 MG/1
40 TABLET, FILM COATED ORAL NIGHTLY
Qty: 90 TABLET | Refills: 1 | Status: SHIPPED | OUTPATIENT
Start: 2025-08-27 | End: 2025-08-27 | Stop reason: SDUPTHER

## 2025-08-27 RX ORDER — NAPROXEN SODIUM 220 MG/1
81 TABLET, FILM COATED ORAL DAILY
COMMUNITY

## 2025-08-27 RX ORDER — CLOPIDOGREL BISULFATE 75 MG/1
75 TABLET ORAL DAILY
Qty: 90 TABLET | Refills: 1 | Status: SHIPPED | OUTPATIENT
Start: 2025-08-27

## 2025-08-27 RX ORDER — METOPROLOL TARTRATE 25 MG/1
25 TABLET, FILM COATED ORAL 2 TIMES DAILY
COMMUNITY
End: 2025-08-27 | Stop reason: SDUPTHER

## 2025-08-27 RX ORDER — BUMETANIDE 0.5 MG/1
1 TABLET ORAL
COMMUNITY
End: 2025-08-27 | Stop reason: SDUPTHER